# Patient Record
Sex: FEMALE | Race: WHITE | NOT HISPANIC OR LATINO | Employment: OTHER | URBAN - METROPOLITAN AREA
[De-identification: names, ages, dates, MRNs, and addresses within clinical notes are randomized per-mention and may not be internally consistent; named-entity substitution may affect disease eponyms.]

---

## 2023-06-30 ENCOUNTER — OFFICE VISIT (OUTPATIENT)
Dept: PRIMARY CARE | Facility: CLINIC | Age: 63
End: 2023-06-30
Payer: COMMERCIAL

## 2023-06-30 VITALS
OXYGEN SATURATION: 95 % | BODY MASS INDEX: 41.72 KG/M2 | DIASTOLIC BLOOD PRESSURE: 74 MMHG | WEIGHT: 265.8 LBS | SYSTOLIC BLOOD PRESSURE: 122 MMHG | HEIGHT: 67 IN | HEART RATE: 76 BPM

## 2023-06-30 DIAGNOSIS — K21.9 GASTROESOPHAGEAL REFLUX DISEASE WITHOUT ESOPHAGITIS: ICD-10-CM

## 2023-06-30 DIAGNOSIS — E55.9 VITAMIN D DEFICIENCY: ICD-10-CM

## 2023-06-30 DIAGNOSIS — E66.01 OBESITY, CLASS III, BMI 40-49.9 (MORBID OBESITY) (MULTI): ICD-10-CM

## 2023-06-30 DIAGNOSIS — I10 ESSENTIAL HYPERTENSION: ICD-10-CM

## 2023-06-30 DIAGNOSIS — M85.80 OSTEOPENIA, UNSPECIFIED LOCATION: ICD-10-CM

## 2023-06-30 DIAGNOSIS — I49.3 VENTRICULAR PREMATURE BEATS: ICD-10-CM

## 2023-06-30 DIAGNOSIS — E78.5 HYPERLIPIDEMIA, UNSPECIFIED HYPERLIPIDEMIA TYPE: ICD-10-CM

## 2023-06-30 DIAGNOSIS — C64.1 MALIGNANT NEOPLASM OF RIGHT KIDNEY (MULTI): ICD-10-CM

## 2023-06-30 DIAGNOSIS — I10 BENIGN HYPERTENSION: ICD-10-CM

## 2023-06-30 DIAGNOSIS — E55.9 MILD VITAMIN D DEFICIENCY: ICD-10-CM

## 2023-06-30 DIAGNOSIS — I49.3 PVC (PREMATURE VENTRICULAR CONTRACTION): ICD-10-CM

## 2023-06-30 DIAGNOSIS — E78.2 MIXED HYPERLIPIDEMIA: ICD-10-CM

## 2023-06-30 DIAGNOSIS — Z23 NEED FOR SHINGLES VACCINE: ICD-10-CM

## 2023-06-30 DIAGNOSIS — Z76.89 ENCOUNTER TO ESTABLISH CARE: ICD-10-CM

## 2023-06-30 DIAGNOSIS — Z12.31 VISIT FOR SCREENING MAMMOGRAM: Primary | ICD-10-CM

## 2023-06-30 PROBLEM — G47.33 OBSTRUCTIVE SLEEP APNEA: Status: ACTIVE | Noted: 2023-06-30

## 2023-06-30 PROBLEM — Z90.5 SOLITARY KIDNEY, ACQUIRED: Status: ACTIVE | Noted: 2023-06-30

## 2023-06-30 PROBLEM — N95.9 MENOPAUSAL AND POSTMENOPAUSAL DISORDER: Status: ACTIVE | Noted: 2023-06-30

## 2023-06-30 PROBLEM — R93.89 ABNORMAL CT SCAN: Status: ACTIVE | Noted: 2023-06-30

## 2023-06-30 PROBLEM — N28.89 RENAL MASS: Status: ACTIVE | Noted: 2021-04-27

## 2023-06-30 PROBLEM — E66.813 OBESITY, CLASS III, BMI 40-49.9 (MORBID OBESITY): Status: ACTIVE | Noted: 2021-05-04

## 2023-06-30 PROBLEM — E04.1 THYROID NODULE: Status: ACTIVE | Noted: 2023-06-30

## 2023-06-30 PROBLEM — R31.0 GROSS HEMATURIA: Status: ACTIVE | Noted: 2021-04-27

## 2023-06-30 PROCEDURE — 99204 OFFICE O/P NEW MOD 45 MIN: CPT | Performed by: CLINICAL NURSE SPECIALIST

## 2023-06-30 PROCEDURE — 3078F DIAST BP <80 MM HG: CPT | Performed by: CLINICAL NURSE SPECIALIST

## 2023-06-30 PROCEDURE — 90750 HZV VACC RECOMBINANT IM: CPT | Performed by: CLINICAL NURSE SPECIALIST

## 2023-06-30 PROCEDURE — 3074F SYST BP LT 130 MM HG: CPT | Performed by: CLINICAL NURSE SPECIALIST

## 2023-06-30 PROCEDURE — 1036F TOBACCO NON-USER: CPT | Performed by: CLINICAL NURSE SPECIALIST

## 2023-06-30 PROCEDURE — 90471 IMMUNIZATION ADMIN: CPT | Performed by: CLINICAL NURSE SPECIALIST

## 2023-06-30 RX ORDER — OMEGA-3-ACID ETHYL ESTERS 1 G/1
CAPSULE, LIQUID FILLED ORAL
Qty: 90 CAPSULE | Refills: 1 | Status: SHIPPED | OUTPATIENT
Start: 2023-06-30 | End: 2023-07-11 | Stop reason: SDUPTHER

## 2023-06-30 RX ORDER — PRAVASTATIN SODIUM 40 MG/1
40 TABLET ORAL DAILY
COMMUNITY
End: 2023-06-30 | Stop reason: SDUPTHER

## 2023-06-30 RX ORDER — OMEPRAZOLE 20 MG/1
20 CAPSULE, DELAYED RELEASE ORAL DAILY
COMMUNITY
End: 2023-06-30 | Stop reason: SDUPTHER

## 2023-06-30 RX ORDER — ATENOLOL 25 MG/1
25 TABLET ORAL DAILY
COMMUNITY
End: 2023-06-30 | Stop reason: SDUPTHER

## 2023-06-30 RX ORDER — OMEGA-3-ACID ETHYL ESTERS 1 G/1
2 CAPSULE, LIQUID FILLED ORAL 2 TIMES DAILY
Qty: 90 CAPSULE | Refills: 1 | Status: SHIPPED | OUTPATIENT
Start: 2023-06-30 | End: 2023-06-30

## 2023-06-30 RX ORDER — LISINOPRIL 20 MG/1
20 TABLET ORAL DAILY
COMMUNITY
End: 2023-06-30 | Stop reason: SDUPTHER

## 2023-06-30 RX ORDER — ALENDRONATE SODIUM 35 MG/1
35 TABLET ORAL
COMMUNITY
End: 2023-06-30 | Stop reason: SDUPTHER

## 2023-06-30 RX ORDER — ALENDRONATE SODIUM 35 MG/1
35 TABLET ORAL
Qty: 90 TABLET | Refills: 1 | Status: SHIPPED | OUTPATIENT
Start: 2023-06-30 | End: 2024-01-08 | Stop reason: SDUPTHER

## 2023-06-30 RX ORDER — ATENOLOL 25 MG/1
25 TABLET ORAL DAILY
Qty: 90 TABLET | Refills: 1 | Status: SHIPPED | OUTPATIENT
Start: 2023-06-30 | End: 2023-12-26

## 2023-06-30 RX ORDER — PRAVASTATIN SODIUM 40 MG/1
40 TABLET ORAL DAILY
Qty: 90 TABLET | Refills: 1 | Status: SHIPPED | OUTPATIENT
Start: 2023-06-30 | End: 2023-12-26

## 2023-06-30 RX ORDER — OMEGA-3-ACID ETHYL ESTERS 1 G/1
2 CAPSULE, LIQUID FILLED ORAL 2 TIMES DAILY
COMMUNITY
End: 2023-06-30 | Stop reason: SDUPTHER

## 2023-06-30 RX ORDER — ASPIRIN 325 MG
50000 TABLET, DELAYED RELEASE (ENTERIC COATED) ORAL
Qty: 13 CAPSULE | Refills: 1 | Status: SHIPPED | OUTPATIENT
Start: 2023-06-30 | End: 2024-01-08 | Stop reason: WASHOUT

## 2023-06-30 RX ORDER — OMEPRAZOLE 20 MG/1
20 CAPSULE, DELAYED RELEASE ORAL DAILY
Qty: 90 CAPSULE | Refills: 1 | Status: SHIPPED | OUTPATIENT
Start: 2023-06-30 | End: 2023-12-26

## 2023-06-30 RX ORDER — LISINOPRIL 20 MG/1
20 TABLET ORAL DAILY
Qty: 90 TABLET | Refills: 1 | Status: SHIPPED | OUTPATIENT
Start: 2023-06-30 | End: 2024-01-08 | Stop reason: SDUPTHER

## 2023-06-30 RX ORDER — ASPIRIN 325 MG
1 TABLET, DELAYED RELEASE (ENTERIC COATED) ORAL
COMMUNITY
Start: 2018-03-05 | End: 2023-06-30 | Stop reason: SDUPTHER

## 2023-06-30 ASSESSMENT — ENCOUNTER SYMPTOMS
FLANK PAIN: 0
ACTIVITY CHANGE: 0
VOMITING: 0
LOSS OF SENSATION IN FEET: 0
CONFUSION: 0
CONSTIPATION: 0
FEVER: 0
BRUISES/BLEEDS EASILY: 0
SLEEP DISTURBANCE: 0
SHORTNESS OF BREATH: 0
NAUSEA: 0
HEMATURIA: 0
SORE THROAT: 0
SEIZURES: 0
CHEST TIGHTNESS: 0
HEADACHES: 0
PALPITATIONS: 0
PHOTOPHOBIA: 0
OCCASIONAL FEELINGS OF UNSTEADINESS: 0
TROUBLE SWALLOWING: 0
DIARRHEA: 0
EYE PAIN: 0
JOINT SWELLING: 0
UNEXPECTED WEIGHT CHANGE: 0
APPETITE CHANGE: 0
ARTHRALGIAS: 0
DIZZINESS: 0
BACK PAIN: 0
CHILLS: 0
ABDOMINAL PAIN: 0
BLOOD IN STOOL: 0
COUGH: 0
MYALGIAS: 0
DEPRESSION: 0
NECK PAIN: 0
WHEEZING: 0
POLYDIPSIA: 0
DYSURIA: 0
FATIGUE: 0
WOUND: 0

## 2023-06-30 ASSESSMENT — ANXIETY QUESTIONNAIRES
3. WORRYING TOO MUCH ABOUT DIFFERENT THINGS: NOT AT ALL
2. NOT BEING ABLE TO STOP OR CONTROL WORRYING: NOT AT ALL
1. FEELING NERVOUS, ANXIOUS, OR ON EDGE: NOT AT ALL
6. BECOMING EASILY ANNOYED OR IRRITABLE: NOT AT ALL
GAD7 TOTAL SCORE: 0
7. FEELING AFRAID AS IF SOMETHING AWFUL MIGHT HAPPEN: NOT AT ALL
4. TROUBLE RELAXING: NOT AT ALL
5. BEING SO RESTLESS THAT IT IS HARD TO SIT STILL: NOT AT ALL
IF YOU CHECKED OFF ANY PROBLEMS ON THIS QUESTIONNAIRE, HOW DIFFICULT HAVE THESE PROBLEMS MADE IT FOR YOU TO DO YOUR WORK, TAKE CARE OF THINGS AT HOME, OR GET ALONG WITH OTHER PEOPLE: NOT DIFFICULT AT ALL

## 2023-06-30 ASSESSMENT — COLUMBIA-SUICIDE SEVERITY RATING SCALE - C-SSRS
1. IN THE PAST MONTH, HAVE YOU WISHED YOU WERE DEAD OR WISHED YOU COULD GO TO SLEEP AND NOT WAKE UP?: NO
2. HAVE YOU ACTUALLY HAD ANY THOUGHTS OF KILLING YOURSELF?: NO
6. HAVE YOU EVER DONE ANYTHING, STARTED TO DO ANYTHING, OR PREPARED TO DO ANYTHING TO END YOUR LIFE?: NO

## 2023-06-30 ASSESSMENT — PATIENT HEALTH QUESTIONNAIRE - PHQ9
SUM OF ALL RESPONSES TO PHQ9 QUESTIONS 1 AND 2: 0
1. LITTLE INTEREST OR PLEASURE IN DOING THINGS: NOT AT ALL
2. FEELING DOWN, DEPRESSED OR HOPELESS: NOT AT ALL

## 2023-06-30 NOTE — PROGRESS NOTES
Subjective   Patient ID: Ana Bustillos is a 62 y.o. female who presents for Establish Care (From [place]).  HPI    New patient here today to establish care.  Transfer care from [facility].  Last OV November 2023.     Follows with [facility] for Nephrology, every 6 months. Nephrectomy.     Overall feels well. Tolerating current medications.     Review of Systems   Constitutional:  Negative for activity change, appetite change, chills, fatigue, fever and unexpected weight change.   HENT:  Negative for ear pain, hearing loss, nosebleeds, sore throat, tinnitus and trouble swallowing.    Eyes:  Negative for photophobia, pain and visual disturbance.   Respiratory:  Negative for cough, chest tightness, shortness of breath and wheezing.    Cardiovascular:  Negative for chest pain, palpitations and leg swelling.   Gastrointestinal:  Negative for abdominal pain, blood in stool, constipation, diarrhea, nausea and vomiting.   Endocrine: Negative for cold intolerance, heat intolerance, polydipsia and polyuria.   Genitourinary:  Negative for dysuria, flank pain and hematuria.   Musculoskeletal:  Negative for arthralgias, back pain, joint swelling, myalgias and neck pain.   Skin:  Negative for pallor, rash and wound.   Allergic/Immunologic: Negative for immunocompromised state.   Neurological:  Negative for dizziness, seizures and headaches.   Hematological:  Does not bruise/bleed easily.   Psychiatric/Behavioral:  Negative for confusion and sleep disturbance.        Objective   Physical Exam  Vitals and nursing note reviewed.   Constitutional:       General: She is not in acute distress.     Appearance: Normal appearance.   HENT:      Head: Normocephalic.      Nose: Nose normal.   Eyes:      Conjunctiva/sclera: Conjunctivae normal.   Neck:      Vascular: No carotid bruit.   Cardiovascular:      Rate and Rhythm: Normal rate and regular rhythm.      Pulses: Normal pulses.      Heart sounds: Normal heart sounds.   Pulmonary:       Effort: Pulmonary effort is normal.      Breath sounds: Normal breath sounds.   Abdominal:      General: Bowel sounds are normal.      Palpations: Abdomen is soft.   Musculoskeletal:         General: Normal range of motion.      Cervical back: Normal range of motion.   Skin:     General: Skin is warm and dry.   Neurological:      Mental Status: She is alert and oriented to person, place, and time. Mental status is at baseline.   Psychiatric:         Mood and Affect: Mood normal.         Behavior: Behavior normal.         Assessment/Plan       Hypertension, PVC: Blood pressure controlled at OV today. Continue Lisinopril and Atenolol.   Hyperlipidemia: Continue Pravastatin. CT Cardiac Scoring ordered.   Vitamin D Deficiency: Continue Vitamin D. Reevaluate with next lab work.   GERD: Continue Prilosec.   Obesity: BMI: 42.26. Lifestyle changes recommended: Diet consisting of low fat foods, lean meats, high fiber, fresh fruits and vegetables. 150 min/ weekly aerobic exercise.   Malignant neoplasm of Kidney: Following with Nephrology.   Osteopenia: Continue Fosamax as prescribed.     Bone Density: December 2022.   Mammogram: December 2022.   Colonoscopy: March 2015. Plan to repeat in 10 years.    Tdap: July 2021.   COVID Vaccine: September 2022.   Shingrix: June 2023.

## 2023-07-10 ENCOUNTER — TELEPHONE (OUTPATIENT)
Dept: PRIMARY CARE | Facility: CLINIC | Age: 63
End: 2023-07-10
Payer: COMMERCIAL

## 2023-07-10 DIAGNOSIS — E78.5 HYPERLIPIDEMIA, UNSPECIFIED HYPERLIPIDEMIA TYPE: ICD-10-CM

## 2023-07-10 NOTE — TELEPHONE ENCOUNTER
VOICEMAIL:   Patient states she was supposed to get a 6 month supply and only got a quantity of 90 and she takes 2 a day please advise.

## 2023-07-11 RX ORDER — OMEGA-3-ACID ETHYL ESTERS 1 G/1
2 CAPSULE, LIQUID FILLED ORAL 2 TIMES DAILY
Qty: 360 CAPSULE | Refills: 1 | Status: SHIPPED | OUTPATIENT
Start: 2023-07-11 | End: 2023-10-17

## 2023-07-24 ENCOUNTER — LAB (OUTPATIENT)
Dept: LAB | Facility: LAB | Age: 63
End: 2023-07-24
Payer: COMMERCIAL

## 2023-07-24 DIAGNOSIS — E78.2 MIXED HYPERLIPIDEMIA: ICD-10-CM

## 2023-07-24 DIAGNOSIS — Z12.31 VISIT FOR SCREENING MAMMOGRAM: ICD-10-CM

## 2023-07-24 DIAGNOSIS — E55.9 VITAMIN D DEFICIENCY: ICD-10-CM

## 2023-07-24 DIAGNOSIS — I10 ESSENTIAL HYPERTENSION: ICD-10-CM

## 2023-07-24 LAB
CALCIDIOL (25 OH VITAMIN D3) (NG/ML) IN SER/PLAS: 83 NG/ML
CHOLESTEROL (MG/DL) IN SER/PLAS: 142 MG/DL (ref 0–199)
CHOLESTEROL IN HDL (MG/DL) IN SER/PLAS: 34.9 MG/DL
CHOLESTEROL/HDL RATIO: 4.1
COBALAMIN (VITAMIN B12) (PG/ML) IN SER/PLAS: 226 PG/ML (ref 211–911)
ERYTHROCYTE DISTRIBUTION WIDTH (RATIO) BY AUTOMATED COUNT: 14.2 % (ref 11.5–14.5)
ERYTHROCYTE MEAN CORPUSCULAR HEMOGLOBIN CONCENTRATION (G/DL) BY AUTOMATED: 33.3 G/DL (ref 32–36)
ERYTHROCYTE MEAN CORPUSCULAR VOLUME (FL) BY AUTOMATED COUNT: 92 FL (ref 80–100)
ERYTHROCYTES (10*6/UL) IN BLOOD BY AUTOMATED COUNT: 4.42 X10E12/L (ref 4–5.2)
HEMATOCRIT (%) IN BLOOD BY AUTOMATED COUNT: 40.5 % (ref 36–46)
HEMOGLOBIN (G/DL) IN BLOOD: 13.5 G/DL (ref 12–16)
LDL: 89 MG/DL (ref 0–99)
LEUKOCYTES (10*3/UL) IN BLOOD BY AUTOMATED COUNT: 7.8 X10E9/L (ref 4.4–11.3)
PLATELETS (10*3/UL) IN BLOOD AUTOMATED COUNT: 244 X10E9/L (ref 150–450)
THYROTROPIN (MIU/L) IN SER/PLAS BY DETECTION LIMIT <= 0.05 MIU/L: 1.56 MIU/L (ref 0.44–3.98)
TRIGLYCERIDE (MG/DL) IN SER/PLAS: 91 MG/DL (ref 0–149)
VLDL: 18 MG/DL (ref 0–40)

## 2023-07-24 PROCEDURE — 82607 VITAMIN B-12: CPT

## 2023-07-24 PROCEDURE — 82306 VITAMIN D 25 HYDROXY: CPT

## 2023-07-24 PROCEDURE — 85027 COMPLETE CBC AUTOMATED: CPT

## 2023-07-24 PROCEDURE — 80061 LIPID PANEL: CPT

## 2023-07-24 PROCEDURE — 36415 COLL VENOUS BLD VENIPUNCTURE: CPT

## 2023-07-24 PROCEDURE — 84443 ASSAY THYROID STIM HORMONE: CPT

## 2023-07-31 ENCOUNTER — TELEPHONE (OUTPATIENT)
Dept: PRIMARY CARE | Facility: CLINIC | Age: 63
End: 2023-07-31
Payer: COMMERCIAL

## 2023-07-31 NOTE — TELEPHONE ENCOUNTER
----- Message from LUKE Mcdonald-CNS sent at 7/30/2023 10:39 PM EDT -----  B12 is on the low end. Recommend to start/increase Vitamin B12. Remaining blood work stable. Repeat lab work prior to follow up appointment. Thank you!

## 2023-10-06 ENCOUNTER — HOSPITAL ENCOUNTER (OUTPATIENT)
Dept: RADIOLOGY | Facility: HOSPITAL | Age: 63
Discharge: HOME | End: 2023-10-06
Payer: COMMERCIAL

## 2023-10-06 DIAGNOSIS — E78.2 MIXED HYPERLIPIDEMIA: ICD-10-CM

## 2023-10-06 DIAGNOSIS — I10 ESSENTIAL (PRIMARY) HYPERTENSION: ICD-10-CM

## 2023-10-06 PROCEDURE — 75571 CT HRT W/O DYE W/CA TEST: CPT

## 2023-10-09 ENCOUNTER — TELEPHONE (OUTPATIENT)
Dept: PRIMARY CARE | Facility: CLINIC | Age: 63
End: 2023-10-09
Payer: COMMERCIAL

## 2023-10-09 NOTE — TELEPHONE ENCOUNTER
Maddi Ramírez, APRN-CNP  Myriam Gonzalez, JENNY; Radha Nelson MA  Please let patient know that CT Cardiac Scoring indicates moderate risk. Most recent LDL at goal. Continue Pravastatin. Start ASA 81mg daily. Keep follow up appointment with blood work as ordered. Thank you!

## 2023-10-10 ENCOUNTER — TELEPHONE (OUTPATIENT)
Dept: PRIMARY CARE | Facility: CLINIC | Age: 63
End: 2023-10-10
Payer: COMMERCIAL

## 2023-10-10 NOTE — TELEPHONE ENCOUNTER
Patient left message on voice mail to  call her back,   I called and left a message to call the office

## 2023-10-17 DIAGNOSIS — E78.5 HYPERLIPIDEMIA, UNSPECIFIED HYPERLIPIDEMIA TYPE: ICD-10-CM

## 2023-10-17 RX ORDER — OMEGA-3-ACID ETHYL ESTERS 1 G/1
2 CAPSULE, LIQUID FILLED ORAL 2 TIMES DAILY
Qty: 90 CAPSULE | Refills: 1 | Status: SHIPPED | OUTPATIENT
Start: 2023-10-17 | End: 2024-01-08 | Stop reason: SDUPTHER

## 2023-12-06 ENCOUNTER — HOSPITAL ENCOUNTER (OUTPATIENT)
Dept: RADIOLOGY | Facility: HOSPITAL | Age: 63
Discharge: HOME | End: 2023-12-06
Payer: MEDICARE

## 2023-12-06 DIAGNOSIS — Z12.31 VISIT FOR SCREENING MAMMOGRAM: ICD-10-CM

## 2023-12-06 PROCEDURE — 77067 SCR MAMMO BI INCL CAD: CPT

## 2023-12-06 PROCEDURE — 77063 BREAST TOMOSYNTHESIS BI: CPT

## 2023-12-06 PROCEDURE — 77063 BREAST TOMOSYNTHESIS BI: CPT | Mod: BILATERAL PROCEDURE | Performed by: RADIOLOGY

## 2023-12-06 PROCEDURE — 77067 SCR MAMMO BI INCL CAD: CPT | Mod: BILATERAL PROCEDURE | Performed by: RADIOLOGY

## 2023-12-07 ENCOUNTER — TELEPHONE (OUTPATIENT)
Dept: PRIMARY CARE | Facility: CLINIC | Age: 63
End: 2023-12-07
Payer: MEDICARE

## 2023-12-07 DIAGNOSIS — R92.8 ABNORMAL MAMMOGRAM OF RIGHT BREAST: ICD-10-CM

## 2023-12-07 NOTE — TELEPHONE ENCOUNTER
----- Message from LUKE Mcdonald-CNS sent at 12/6/2023  9:17 PM EST -----  Please order diagnostic right breast Mammogram and notify patient that additional views are needed. Thank you!

## 2023-12-12 ENCOUNTER — HOSPITAL ENCOUNTER (OUTPATIENT)
Dept: RADIOLOGY | Facility: HOSPITAL | Age: 63
Discharge: HOME | End: 2023-12-12
Payer: COMMERCIAL

## 2023-12-12 DIAGNOSIS — R92.8 ABNORMAL MAMMOGRAM OF RIGHT BREAST: ICD-10-CM

## 2023-12-12 PROCEDURE — 76642 ULTRASOUND BREAST LIMITED: CPT | Mod: RT

## 2023-12-12 PROCEDURE — 77065 DX MAMMO INCL CAD UNI: CPT | Mod: RIGHT SIDE | Performed by: RADIOLOGY

## 2023-12-12 PROCEDURE — G0279 TOMOSYNTHESIS, MAMMO: HCPCS | Mod: RIGHT SIDE | Performed by: RADIOLOGY

## 2023-12-12 PROCEDURE — 76642 ULTRASOUND BREAST LIMITED: CPT | Mod: RIGHT SIDE | Performed by: RADIOLOGY

## 2023-12-12 PROCEDURE — 77065 DX MAMMO INCL CAD UNI: CPT | Mod: RT

## 2023-12-13 ENCOUNTER — TELEPHONE (OUTPATIENT)
Dept: PRIMARY CARE | Facility: CLINIC | Age: 63
End: 2023-12-13
Payer: MEDICARE

## 2023-12-13 DIAGNOSIS — R92.8 ABNORMAL MAMMOGRAM OF RIGHT BREAST: ICD-10-CM

## 2023-12-13 NOTE — TELEPHONE ENCOUNTER
----- Message from LUKE Mcdonald-CNS sent at 12/12/2023  7:13 PM EST -----  Please order 6 month follow up Mammogram. Thank you!

## 2023-12-24 DIAGNOSIS — I49.3 VENTRICULAR PREMATURE BEATS: ICD-10-CM

## 2023-12-24 DIAGNOSIS — E78.5 HYPERLIPIDEMIA, UNSPECIFIED HYPERLIPIDEMIA TYPE: ICD-10-CM

## 2023-12-24 DIAGNOSIS — K21.9 GASTROESOPHAGEAL REFLUX DISEASE WITHOUT ESOPHAGITIS: ICD-10-CM

## 2023-12-26 RX ORDER — OMEPRAZOLE 20 MG/1
20 CAPSULE, DELAYED RELEASE ORAL DAILY
Qty: 90 CAPSULE | Refills: 1 | Status: SHIPPED | OUTPATIENT
Start: 2023-12-26 | End: 2024-01-08 | Stop reason: SDUPTHER

## 2023-12-26 RX ORDER — PRAVASTATIN SODIUM 40 MG/1
40 TABLET ORAL DAILY
Qty: 90 TABLET | Refills: 1 | Status: SHIPPED | OUTPATIENT
Start: 2023-12-26 | End: 2024-01-08 | Stop reason: SDUPTHER

## 2023-12-26 RX ORDER — ATENOLOL 25 MG/1
25 TABLET ORAL DAILY
Qty: 90 TABLET | Refills: 1 | Status: SHIPPED | OUTPATIENT
Start: 2023-12-26 | End: 2024-01-08 | Stop reason: SDUPTHER

## 2023-12-31 ENCOUNTER — APPOINTMENT (OUTPATIENT)
Dept: CARDIOLOGY | Facility: HOSPITAL | Age: 63
End: 2023-12-31
Payer: MEDICARE

## 2023-12-31 ENCOUNTER — APPOINTMENT (OUTPATIENT)
Dept: RADIOLOGY | Facility: HOSPITAL | Age: 63
End: 2023-12-31
Payer: MEDICARE

## 2023-12-31 ENCOUNTER — HOSPITAL ENCOUNTER (EMERGENCY)
Facility: HOSPITAL | Age: 63
Discharge: HOME | End: 2023-12-31
Attending: EMERGENCY MEDICINE
Payer: MEDICARE

## 2023-12-31 VITALS
WEIGHT: 270 LBS | RESPIRATION RATE: 22 BRPM | OXYGEN SATURATION: 95 % | DIASTOLIC BLOOD PRESSURE: 98 MMHG | HEIGHT: 66 IN | BODY MASS INDEX: 43.39 KG/M2 | TEMPERATURE: 98.4 F | SYSTOLIC BLOOD PRESSURE: 166 MMHG | HEART RATE: 86 BPM

## 2023-12-31 DIAGNOSIS — R20.2 COMPLAINT OF PARESTHESIA: Primary | ICD-10-CM

## 2023-12-31 LAB
ALBUMIN SERPL BCP-MCNC: 3.9 G/DL (ref 3.4–5)
ALP SERPL-CCNC: 49 U/L (ref 33–136)
ALT SERPL W P-5'-P-CCNC: 33 U/L (ref 7–45)
ANION GAP SERPL CALC-SCNC: 12 MMOL/L (ref 10–20)
AST SERPL W P-5'-P-CCNC: 21 U/L (ref 9–39)
BASOPHILS # BLD AUTO: 0.07 X10*3/UL (ref 0–0.1)
BASOPHILS NFR BLD AUTO: 0.9 %
BILIRUB SERPL-MCNC: 0.8 MG/DL (ref 0–1.2)
BUN SERPL-MCNC: 20 MG/DL (ref 6–23)
CALCIUM SERPL-MCNC: 9.7 MG/DL (ref 8.6–10.3)
CARDIAC TROPONIN I PNL SERPL HS: 4 NG/L (ref 0–13)
CHLORIDE SERPL-SCNC: 102 MMOL/L (ref 98–107)
CO2 SERPL-SCNC: 24 MMOL/L (ref 21–32)
CREAT SERPL-MCNC: 1.12 MG/DL (ref 0.5–1.05)
EOSINOPHIL # BLD AUTO: 0.14 X10*3/UL (ref 0–0.7)
EOSINOPHIL NFR BLD AUTO: 1.7 %
ERYTHROCYTE [DISTWIDTH] IN BLOOD BY AUTOMATED COUNT: 14 % (ref 11.5–14.5)
GFR SERPL CREATININE-BSD FRML MDRD: 55 ML/MIN/1.73M*2
GLUCOSE SERPL-MCNC: 123 MG/DL (ref 74–99)
HCT VFR BLD AUTO: 42.3 % (ref 36–46)
HGB BLD-MCNC: 14.5 G/DL (ref 12–16)
IMM GRANULOCYTES # BLD AUTO: 0.13 X10*3/UL (ref 0–0.7)
IMM GRANULOCYTES NFR BLD AUTO: 1.6 % (ref 0–0.9)
LYMPHOCYTES # BLD AUTO: 2.03 X10*3/UL (ref 1.2–4.8)
LYMPHOCYTES NFR BLD AUTO: 24.8 %
MAGNESIUM SERPL-MCNC: 1.7 MG/DL (ref 1.6–2.4)
MCH RBC QN AUTO: 30.7 PG (ref 26–34)
MCHC RBC AUTO-ENTMCNC: 34.3 G/DL (ref 32–36)
MCV RBC AUTO: 90 FL (ref 80–100)
MONOCYTES # BLD AUTO: 0.65 X10*3/UL (ref 0.1–1)
MONOCYTES NFR BLD AUTO: 7.9 %
NEUTROPHILS # BLD AUTO: 5.16 X10*3/UL (ref 1.2–7.7)
NEUTROPHILS NFR BLD AUTO: 63.1 %
NRBC BLD-RTO: 0 /100 WBCS (ref 0–0)
PHOSPHATE SERPL-MCNC: 3.3 MG/DL (ref 2.5–4.9)
PLATELET # BLD AUTO: 233 X10*3/UL (ref 150–450)
POTASSIUM SERPL-SCNC: 4.1 MMOL/L (ref 3.5–5.3)
PROT SERPL-MCNC: 7 G/DL (ref 6.4–8.2)
RBC # BLD AUTO: 4.72 X10*6/UL (ref 4–5.2)
SODIUM SERPL-SCNC: 134 MMOL/L (ref 136–145)
WBC # BLD AUTO: 8.2 X10*3/UL (ref 4.4–11.3)

## 2023-12-31 PROCEDURE — 71045 X-RAY EXAM CHEST 1 VIEW: CPT | Performed by: RADIOLOGY

## 2023-12-31 PROCEDURE — 36415 COLL VENOUS BLD VENIPUNCTURE: CPT | Performed by: EMERGENCY MEDICINE

## 2023-12-31 PROCEDURE — 84100 ASSAY OF PHOSPHORUS: CPT | Performed by: EMERGENCY MEDICINE

## 2023-12-31 PROCEDURE — 99283 EMERGENCY DEPT VISIT LOW MDM: CPT | Mod: 25

## 2023-12-31 PROCEDURE — 80053 COMPREHEN METABOLIC PANEL: CPT | Performed by: EMERGENCY MEDICINE

## 2023-12-31 PROCEDURE — 93005 ELECTROCARDIOGRAM TRACING: CPT

## 2023-12-31 PROCEDURE — 71045 X-RAY EXAM CHEST 1 VIEW: CPT

## 2023-12-31 PROCEDURE — 84484 ASSAY OF TROPONIN QUANT: CPT | Performed by: EMERGENCY MEDICINE

## 2023-12-31 PROCEDURE — 83735 ASSAY OF MAGNESIUM: CPT | Performed by: EMERGENCY MEDICINE

## 2023-12-31 PROCEDURE — 85025 COMPLETE CBC W/AUTO DIFF WBC: CPT | Performed by: EMERGENCY MEDICINE

## 2023-12-31 PROCEDURE — 99284 EMERGENCY DEPT VISIT MOD MDM: CPT | Performed by: EMERGENCY MEDICINE

## 2023-12-31 ASSESSMENT — LIFESTYLE VARIABLES
REASON UNABLE TO ASSESS: NO
EVER FELT BAD OR GUILTY ABOUT YOUR DRINKING: NO
HAVE YOU EVER FELT YOU SHOULD CUT DOWN ON YOUR DRINKING: NO
HAVE PEOPLE ANNOYED YOU BY CRITICIZING YOUR DRINKING: NO
EVER HAD A DRINK FIRST THING IN THE MORNING TO STEADY YOUR NERVES TO GET RID OF A HANGOVER: NO

## 2023-12-31 ASSESSMENT — PAIN SCALES - GENERAL: PAINLEVEL_OUTOF10: 0 - NO PAIN

## 2023-12-31 ASSESSMENT — PAIN - FUNCTIONAL ASSESSMENT: PAIN_FUNCTIONAL_ASSESSMENT: 0-10

## 2023-12-31 NOTE — ED PROVIDER NOTES
HPI   Chief Complaint   Patient presents with    Tingling     In hands rosy for past 3 days.  No pain.  No sob.       Patient presents to the emergency department for intermittent bilateral hand tingling.  Patient states she is under a lot of stress lately.  She has recovered from kidney cancer after nephrectomy.  She has her next 6-month scan coming up next week and has been under a lot of stress.  She is also concerned about her heart.  Patient has any chest pain, shortness of breath, diaphoresis, exertional symptoms, nausea vomiting.  The tingling in the hands is not currently going on for last few days.  It is bilateral and intermittent.  Patient has any fever or sick contacts.                          [place] Coma Scale Score: 15                  Patient History   Past Medical History:   Diagnosis Date    Personal history of COVID-19 01/25/2022    History of COVID-19    Personal history of other diseases of urinary system     History of hematuria    Personal history of other malignant neoplasm of kidney     History of renal carcinoma    Personal history of other malignant neoplasm of skin     History of other malignant neoplasm of skin    Urinary tract infection, site not specified 03/16/2021    Acute lower UTI     Past Surgical History:   Procedure Laterality Date    OTHER SURGICAL HISTORY  01/16/2020    Excision of basal cell carcinoma    OTHER SURGICAL HISTORY  07/27/2021    Kidney surgery    OTHER SURGICAL HISTORY  07/27/2021    Nephrectomy    OTHER SURGICAL HISTORY  03/16/2021    Cataract surgery     No family history on file.  Social History     Tobacco Use    Smoking status: Never    Smokeless tobacco: Never   Vaping Use    Vaping Use: Never used   Substance Use Topics    Alcohol use: Yes     Alcohol/week: 2.0 standard drinks of alcohol     Types: 1 Glasses of wine, 1 Shots of liquor per week    Drug use: Never       Physical Exam   ED Triage Vitals [12/31/23 1028]   Temp Heart Rate Resp BP   (!) -17.3 °C  (0.8 °F) 90 16 (!) 181/108      SpO2 Temp Source Heart Rate Source Patient Position   96 % Tympanic Monitor Sitting      BP Location FiO2 (%)     Left arm --       Physical Exam  Vitals and nursing note reviewed.   Constitutional:       General: She is not in acute distress.     Appearance: She is well-developed.   HENT:      Head: Normocephalic and atraumatic.      Right Ear: External ear normal.      Left Ear: External ear normal.      Nose: Nose normal.      Mouth/Throat:      Mouth: Mucous membranes are moist.      Pharynx: Oropharynx is clear.   Eyes:      Extraocular Movements: Extraocular movements intact.      Conjunctiva/sclera: Conjunctivae normal.   Neck:      Comments: Trachea midline  Cardiovascular:      Rate and Rhythm: Normal rate.      Pulses: Normal pulses.   Pulmonary:      Effort: Pulmonary effort is normal. No respiratory distress.      Breath sounds: Normal breath sounds.   Abdominal:      General: Bowel sounds are normal.      Palpations: Abdomen is soft.      Tenderness: There is no abdominal tenderness.   Musculoskeletal:         General: No swelling or tenderness.      Cervical back: No tenderness.   Skin:     General: Skin is warm and dry.      Capillary Refill: Capillary refill takes less than 2 seconds.   Neurological:      General: No focal deficit present.      Mental Status: She is alert and oriented to person, place, and time.   Psychiatric:         Mood and Affect: Mood normal.         Thought Content: Thought content does not include homicidal or suicidal ideation.         ED Course & MDM   Diagnoses as of 12/31/23 1227   Complaint of paresthesia   Creatinine elevation       Medical Decision Making  Patient presents with bilateral tingling of the hands and cardiac concern. Available chart reviewed. On initial assessment the patient was found non-toxic, no acute distress, vitals hemodynamically stable and afebrile. Initial concern for ACS, electrolyte abnormalities.  Given patient's  concern we will do a cardiac workup.  Clinically I feel this is all anxiety related.  Had discussion with the patient about strokes as well as metastatic spread of cancers.  She is not concerned about this.  Will not obtain a CT of the head at this time. CBC shows no leukocytosis, no anemia, no thrombocytopenia.  Magnesium and phosphorus are normal.  Metabolic panel shows slight hyponatremia of 134, elevation of creatinine at 1.12 and normal liver function.  Patient admits she has not drink as much water as she should be.  Troponins normal.  Chest x-ray is read as negative.  Patient feels reassured.    No indication for admission.  Discussed findings and diagnosis with the patient, follow-up and return to ED precautions given, patient voiced understanding, agrees with plan, questions answered, patient was discharged in stable condition.        Amount and/or Complexity of Data Reviewed  ECG/medicine tests: independent interpretation performed.     Details: EKG obtained at 1143 shows sinus rhythm, rate 77, , QTc 525, T wave inversion in lead III, no ST segment changes        Procedure  Procedures     Cachorro Pimentel MD  12/31/23 1222       Cachorro Pimentel MD  12/31/23 1228

## 2024-01-05 ENCOUNTER — APPOINTMENT (OUTPATIENT)
Dept: PRIMARY CARE | Facility: CLINIC | Age: 64
End: 2024-01-05
Payer: MEDICARE

## 2024-01-07 DIAGNOSIS — I10 BENIGN HYPERTENSION: ICD-10-CM

## 2024-01-08 ENCOUNTER — OFFICE VISIT (OUTPATIENT)
Dept: PRIMARY CARE | Facility: CLINIC | Age: 64
End: 2024-01-08
Payer: MEDICARE

## 2024-01-08 VITALS
HEIGHT: 67 IN | SYSTOLIC BLOOD PRESSURE: 120 MMHG | HEART RATE: 68 BPM | BODY MASS INDEX: 43.16 KG/M2 | WEIGHT: 275 LBS | DIASTOLIC BLOOD PRESSURE: 80 MMHG

## 2024-01-08 DIAGNOSIS — I10 ESSENTIAL HYPERTENSION: ICD-10-CM

## 2024-01-08 DIAGNOSIS — E55.9 VITAMIN D DEFICIENCY: ICD-10-CM

## 2024-01-08 DIAGNOSIS — I49.3 VENTRICULAR PREMATURE BEATS: ICD-10-CM

## 2024-01-08 DIAGNOSIS — E78.2 MIXED HYPERLIPIDEMIA: ICD-10-CM

## 2024-01-08 DIAGNOSIS — E78.5 HYPERLIPIDEMIA, UNSPECIFIED HYPERLIPIDEMIA TYPE: ICD-10-CM

## 2024-01-08 DIAGNOSIS — Z12.31 VISIT FOR SCREENING MAMMOGRAM: ICD-10-CM

## 2024-01-08 DIAGNOSIS — I10 BENIGN HYPERTENSION: ICD-10-CM

## 2024-01-08 DIAGNOSIS — K21.9 GASTROESOPHAGEAL REFLUX DISEASE WITHOUT ESOPHAGITIS: ICD-10-CM

## 2024-01-08 DIAGNOSIS — M85.80 OSTEOPENIA, UNSPECIFIED LOCATION: ICD-10-CM

## 2024-01-08 DIAGNOSIS — Z00.00 WELCOME TO MEDICARE PREVENTIVE VISIT: ICD-10-CM

## 2024-01-08 PROCEDURE — 3079F DIAST BP 80-89 MM HG: CPT | Performed by: CLINICAL NURSE SPECIALIST

## 2024-01-08 PROCEDURE — 99214 OFFICE O/P EST MOD 30 MIN: CPT | Performed by: CLINICAL NURSE SPECIALIST

## 2024-01-08 PROCEDURE — 3074F SYST BP LT 130 MM HG: CPT | Performed by: CLINICAL NURSE SPECIALIST

## 2024-01-08 PROCEDURE — 90677 PCV20 VACCINE IM: CPT | Performed by: CLINICAL NURSE SPECIALIST

## 2024-01-08 PROCEDURE — 1159F MED LIST DOCD IN RCRD: CPT | Performed by: CLINICAL NURSE SPECIALIST

## 2024-01-08 PROCEDURE — 99497 ADVNCD CARE PLAN 30 MIN: CPT | Performed by: CLINICAL NURSE SPECIALIST

## 2024-01-08 PROCEDURE — 1036F TOBACCO NON-USER: CPT | Performed by: CLINICAL NURSE SPECIALIST

## 2024-01-08 PROCEDURE — 1160F RVW MEDS BY RX/DR IN RCRD: CPT | Performed by: CLINICAL NURSE SPECIALIST

## 2024-01-08 PROCEDURE — G0009 ADMIN PNEUMOCOCCAL VACCINE: HCPCS | Performed by: CLINICAL NURSE SPECIALIST

## 2024-01-08 PROCEDURE — G0402 INITIAL PREVENTIVE EXAM: HCPCS | Performed by: CLINICAL NURSE SPECIALIST

## 2024-01-08 PROCEDURE — 1170F FXNL STATUS ASSESSED: CPT | Performed by: CLINICAL NURSE SPECIALIST

## 2024-01-08 PROCEDURE — 1126F AMNT PAIN NOTED NONE PRSNT: CPT | Performed by: CLINICAL NURSE SPECIALIST

## 2024-01-08 RX ORDER — ATENOLOL 25 MG/1
25 TABLET ORAL DAILY
Qty: 90 TABLET | Refills: 1 | Status: SHIPPED | OUTPATIENT
Start: 2024-01-08

## 2024-01-08 RX ORDER — OMEPRAZOLE 20 MG/1
20 CAPSULE, DELAYED RELEASE ORAL DAILY
Qty: 90 CAPSULE | Refills: 1 | Status: SHIPPED | OUTPATIENT
Start: 2024-01-08

## 2024-01-08 RX ORDER — ACETAMINOPHEN 500 MG
5000 TABLET ORAL DAILY
COMMUNITY

## 2024-01-08 RX ORDER — CYANOCOBALAMIN (VITAMIN B-12) 250 MCG
250 TABLET ORAL DAILY
COMMUNITY

## 2024-01-08 RX ORDER — PRAVASTATIN SODIUM 40 MG/1
40 TABLET ORAL DAILY
Qty: 90 TABLET | Refills: 1 | Status: SHIPPED | OUTPATIENT
Start: 2024-01-08

## 2024-01-08 RX ORDER — ALENDRONATE SODIUM 35 MG/1
35 TABLET ORAL
Qty: 90 TABLET | Refills: 1 | Status: SHIPPED | OUTPATIENT
Start: 2024-01-08

## 2024-01-08 RX ORDER — OMEGA-3-ACID ETHYL ESTERS 1 G/1
2 CAPSULE, LIQUID FILLED ORAL 2 TIMES DAILY
Qty: 90 CAPSULE | Refills: 1 | Status: SHIPPED | OUTPATIENT
Start: 2024-01-08 | End: 2024-01-29

## 2024-01-08 RX ORDER — LISINOPRIL 20 MG/1
20 TABLET ORAL DAILY
Qty: 90 TABLET | Refills: 1 | Status: SHIPPED | OUTPATIENT
Start: 2024-01-08 | End: 2024-04-16 | Stop reason: ALTCHOICE

## 2024-01-08 RX ORDER — LISINOPRIL 20 MG/1
20 TABLET ORAL DAILY
Qty: 90 TABLET | Refills: 1 | Status: SHIPPED | OUTPATIENT
Start: 2024-01-08

## 2024-01-08 ASSESSMENT — ENCOUNTER SYMPTOMS
NECK PAIN: 0
ACTIVITY CHANGE: 0
DYSURIA: 0
EYE PAIN: 0
OCCASIONAL FEELINGS OF UNSTEADINESS: 0
DEPRESSION: 0
DIZZINESS: 0
LOSS OF SENSATION IN FEET: 0
ABDOMINAL PAIN: 0
UNEXPECTED WEIGHT CHANGE: 0
PHOTOPHOBIA: 0
SHORTNESS OF BREATH: 0
BLOOD IN STOOL: 0
APPETITE CHANGE: 0
CONSTIPATION: 0
CONFUSION: 0
BRUISES/BLEEDS EASILY: 0
JOINT SWELLING: 0
MYALGIAS: 0
BACK PAIN: 0
POLYDIPSIA: 0
HEADACHES: 0
NAUSEA: 0
VOMITING: 0
ARTHRALGIAS: 0
DIARRHEA: 0
WOUND: 0
SEIZURES: 0
SLEEP DISTURBANCE: 0
HEMATURIA: 0
PALPITATIONS: 0
FLANK PAIN: 0
FATIGUE: 0
TROUBLE SWALLOWING: 0
SORE THROAT: 0
CHEST TIGHTNESS: 0
COUGH: 0
FEVER: 0
WHEEZING: 0
CHILLS: 0

## 2024-01-08 ASSESSMENT — ACTIVITIES OF DAILY LIVING (ADL)
GROCERY_SHOPPING: INDEPENDENT
MANAGING_FINANCES: INDEPENDENT
DOING_HOUSEWORK: INDEPENDENT
DRESSING: INDEPENDENT
TAKING_MEDICATION: INDEPENDENT
BATHING: INDEPENDENT

## 2024-01-08 ASSESSMENT — PATIENT HEALTH QUESTIONNAIRE - PHQ9
2. FEELING DOWN, DEPRESSED OR HOPELESS: NOT AT ALL
SUM OF ALL RESPONSES TO PHQ9 QUESTIONS 1 AND 2: 0
1. LITTLE INTEREST OR PLEASURE IN DOING THINGS: NOT AT ALL

## 2024-01-08 ASSESSMENT — VISUAL ACUITY
OD_CC: 20/13
OS_CC: 20/25

## 2024-01-08 NOTE — PROGRESS NOTES
Subjective   Reason for Visit: Ana Bustillos is an 63 y.o. female here for a Medicare Wellness visit.     Past Medical, Surgical, and Family History reviewed and updated in chart.    Reviewed all medications by prescribing practitioner or clinical pharmacist (such as prescriptions, OTCs, herbal therapies and supplements) and documented in the medical record.    HPI    Here today as a follow up appointment. Due for Welcome to Medicare.      Follows with [facility] for Nephrology, every 6 months. Nephrectomy.      Overall feels well. Tolerating current medications.     Seen in the ER on 12/31 with Numbness/Tingling. Concerned for Cardiac. Work up negative, felt attributed to by Anxiety. Feels that it was related to all of her Health follow ups, improved at this time. Feels that she has gotten through it.     Patient Care Team:  ALDO Mcdonald as PCP - General (Family Medicine)  JOANNE Rebolledo as Nurse Practitioner (Family Medicine)     Review of Systems   Constitutional:  Negative for activity change, appetite change, chills, fatigue, fever and unexpected weight change.   HENT:  Negative for ear pain, hearing loss, nosebleeds, sore throat, tinnitus and trouble swallowing.    Eyes:  Negative for photophobia, pain and visual disturbance.   Respiratory:  Negative for cough, chest tightness, shortness of breath and wheezing.    Cardiovascular:  Negative for chest pain, palpitations and leg swelling.   Gastrointestinal:  Negative for abdominal pain, blood in stool, constipation, diarrhea, nausea and vomiting.   Endocrine: Negative for cold intolerance, heat intolerance, polydipsia and polyuria.   Genitourinary:  Negative for dysuria, flank pain and hematuria.   Musculoskeletal:  Negative for arthralgias, back pain, joint swelling, myalgias and neck pain.   Skin:  Negative for pallor, rash and wound.   Allergic/Immunologic: Negative for immunocompromised state.   Neurological:  Negative for  "dizziness, seizures and headaches.   Hematological:  Does not bruise/bleed easily.   Psychiatric/Behavioral:  Negative for confusion and sleep disturbance.        Objective   Vitals:  /80 (BP Location: Right arm, Patient Position: Sitting)   Pulse 68   Ht 1.689 m (5' 6.5\")   Wt 125 kg (275 lb)   BMI 43.72 kg/m²       Physical Exam  Vitals and nursing note reviewed.   Constitutional:       General: She is not in acute distress.     Appearance: Normal appearance.   HENT:      Head: Normocephalic.      Nose: Nose normal.   Eyes:      Conjunctiva/sclera: Conjunctivae normal.   Neck:      Vascular: No carotid bruit.   Cardiovascular:      Rate and Rhythm: Normal rate and regular rhythm.      Pulses: Normal pulses.      Heart sounds: Normal heart sounds.   Pulmonary:      Effort: Pulmonary effort is normal.      Breath sounds: Normal breath sounds.   Abdominal:      General: Bowel sounds are normal.      Palpations: Abdomen is soft.   Musculoskeletal:         General: Normal range of motion.      Cervical back: Normal range of motion.   Skin:     General: Skin is warm and dry.   Neurological:      Mental Status: She is alert and oriented to person, place, and time. Mental status is at baseline.   Psychiatric:         Mood and Affect: Mood normal.         Behavior: Behavior normal.         Assessment/Plan   Problem List Items Addressed This Visit       Essential hypertension    Gastroesophageal reflux disease without esophagitis    Relevant Medications    omeprazole (PriLOSEC) 20 mg DR capsule    Hyperlipidemia    Relevant Medications    pravastatin (Pravachol) 40 mg tablet    omega-3 acid ethyl esters (Lovaza) 1 gram capsule    Osteopenia    Relevant Medications    alendronate (Fosamax) 35 mg tablet    Vitamin D deficiency     Other Visit Diagnoses       Visit for screening mammogram        Ventricular premature beats        Relevant Medications    atenolol (Tenormin) 25 mg tablet    Benign hypertension        " Relevant Medications    lisinopril 20 mg tablet        Reviewed most recent records available.      Hypertension, PVC: Blood pressure controlled at OV today. Continue Lisinopril and Atenolol.   Hyperlipidemia: Continue Pravastatin. CT Cardiac Scoring: October 2023, moderate risk.   Vitamin D Deficiency: Continue Vitamin D. Reevaluate with next lab work.   GERD: Continue Prilosec.   Obesity: BMI: 43.75. Lifestyle changes recommended: Diet consisting of low fat foods, lean meats, high fiber, fresh fruits and vegetables. 150 min/ weekly aerobic exercise.   Malignant neoplasm of Kidney: Following with Nephrology. Follow up scheduled for tomorrow.   Osteopenia: Continue Fosamax as prescribed.  Medicare Wellness: Routine and age appropriate recommendations discussed with the patient today and patient verbalized understanding of the recommendations.  Questions answered.  Age appropriate immunizations and preventative screenings discussed with the patient and ordered as appropriate. Labs updated and ordered as indicated. Recommend healthy diet and daily exercise to maintain healthy body weight. ECG done in the ER on 12/31/2023. Discussed ACP documents.   Renal Insufficiency: Planning to discuss with Nephrology tomorrow.       Bone Density: December 2022.   Mammogram: December 2023. Short term follow up.   Colonoscopy: March 2015. Plan to repeat in 10 years.    Tdap: July 2021.   Flu Vaccine: October 2023.   COVID Vaccine: October 2023.   Shingrix: June 2023. Discussed updated.   Discussed RSV Vaccine.   Prevnar 20: January 2024.

## 2024-01-10 LAB
ATRIAL RATE: 77 BPM
P AXIS: 23 DEGREES
PR INTERVAL: 191 MS
Q ONSET: 252 MS
QRS COUNT: 12 BEATS
QRS DURATION: 94 MS
QT INTERVAL: 375 MS
QTC CALCULATION(BAZETT): 425 MS
QTC FREDERICIA: 407 MS
R AXIS: -24 DEGREES
T AXIS: 12 DEGREES
T OFFSET: 440 MS
VENTRICULAR RATE: 77 BPM

## 2024-01-29 DIAGNOSIS — E78.5 HYPERLIPIDEMIA, UNSPECIFIED HYPERLIPIDEMIA TYPE: ICD-10-CM

## 2024-01-29 RX ORDER — OMEGA-3-ACID ETHYL ESTERS 1 G/1
2 CAPSULE, LIQUID FILLED ORAL 2 TIMES DAILY
Qty: 90 CAPSULE | Refills: 1 | Status: SHIPPED | OUTPATIENT
Start: 2024-01-29 | End: 2024-03-28

## 2024-03-28 DIAGNOSIS — E78.5 HYPERLIPIDEMIA, UNSPECIFIED HYPERLIPIDEMIA TYPE: ICD-10-CM

## 2024-03-28 RX ORDER — OMEGA-3-ACID ETHYL ESTERS 1 G/1
2 CAPSULE, LIQUID FILLED ORAL 2 TIMES DAILY
Qty: 90 CAPSULE | Refills: 3 | Status: SHIPPED | OUTPATIENT
Start: 2024-03-28 | End: 2024-04-22

## 2024-04-16 ENCOUNTER — OFFICE VISIT (OUTPATIENT)
Dept: PRIMARY CARE | Facility: CLINIC | Age: 64
End: 2024-04-16
Payer: MEDICARE

## 2024-04-16 VITALS
SYSTOLIC BLOOD PRESSURE: 120 MMHG | DIASTOLIC BLOOD PRESSURE: 70 MMHG | HEART RATE: 72 BPM | HEIGHT: 67 IN | BODY MASS INDEX: 39.87 KG/M2 | WEIGHT: 254 LBS

## 2024-04-16 DIAGNOSIS — M25.551 RIGHT HIP PAIN: Primary | ICD-10-CM

## 2024-04-16 PROCEDURE — 1123F ACP DISCUSS/DSCN MKR DOCD: CPT | Performed by: CLINICAL NURSE SPECIALIST

## 2024-04-16 PROCEDURE — 1160F RVW MEDS BY RX/DR IN RCRD: CPT | Performed by: CLINICAL NURSE SPECIALIST

## 2024-04-16 PROCEDURE — 3078F DIAST BP <80 MM HG: CPT | Performed by: CLINICAL NURSE SPECIALIST

## 2024-04-16 PROCEDURE — 1036F TOBACCO NON-USER: CPT | Performed by: CLINICAL NURSE SPECIALIST

## 2024-04-16 PROCEDURE — 1159F MED LIST DOCD IN RCRD: CPT | Performed by: CLINICAL NURSE SPECIALIST

## 2024-04-16 PROCEDURE — 3074F SYST BP LT 130 MM HG: CPT | Performed by: CLINICAL NURSE SPECIALIST

## 2024-04-16 PROCEDURE — 99213 OFFICE O/P EST LOW 20 MIN: CPT | Performed by: CLINICAL NURSE SPECIALIST

## 2024-04-16 ASSESSMENT — ENCOUNTER SYMPTOMS
FATIGUE: 0
CHEST TIGHTNESS: 0
MYALGIAS: 0
CONSTIPATION: 0
DIARRHEA: 0
EYE PAIN: 0
PHOTOPHOBIA: 0
ARTHRALGIAS: 1
HEMATURIA: 0
BLOOD IN STOOL: 0
DYSURIA: 0
BRUISES/BLEEDS EASILY: 0
SEIZURES: 0
NAUSEA: 0
APPETITE CHANGE: 0
DIZZINESS: 0
HEADACHES: 0
FLANK PAIN: 0
FEVER: 0
POLYDIPSIA: 0
BACK PAIN: 0
JOINT SWELLING: 0
CHILLS: 0
DEPRESSION: 0
LOSS OF SENSATION IN FEET: 0
VOMITING: 0
COUGH: 0
ABDOMINAL PAIN: 0
CONFUSION: 0
SORE THROAT: 0
SHORTNESS OF BREATH: 0
WOUND: 0
NECK PAIN: 0
PALPITATIONS: 0
OCCASIONAL FEELINGS OF UNSTEADINESS: 0
UNEXPECTED WEIGHT CHANGE: 0
WHEEZING: 0
SLEEP DISTURBANCE: 0
TROUBLE SWALLOWING: 0
ACTIVITY CHANGE: 0

## 2024-04-16 ASSESSMENT — COLUMBIA-SUICIDE SEVERITY RATING SCALE - C-SSRS
6. HAVE YOU EVER DONE ANYTHING, STARTED TO DO ANYTHING, OR PREPARED TO DO ANYTHING TO END YOUR LIFE?: NO
1. IN THE PAST MONTH, HAVE YOU WISHED YOU WERE DEAD OR WISHED YOU COULD GO TO SLEEP AND NOT WAKE UP?: NO
2. HAVE YOU ACTUALLY HAD ANY THOUGHTS OF KILLING YOURSELF?: NO

## 2024-04-16 ASSESSMENT — PATIENT HEALTH QUESTIONNAIRE - PHQ9
1. LITTLE INTEREST OR PLEASURE IN DOING THINGS: NOT AT ALL
SUM OF ALL RESPONSES TO PHQ9 QUESTIONS 1 AND 2: 0
2. FEELING DOWN, DEPRESSED OR HOPELESS: NOT AT ALL

## 2024-04-16 NOTE — PROGRESS NOTES
Subjective   Patient ID: Ana Bustillos is a 63 y.o. female who presents for Acute Visit (Discuss right hip pain ).  HPI    Patient presents in the office today as an acute visit. Concerned for right hip pain. Feels worse discomfort on the right hip. Worse with walking with incline. Walking on uneven surfaces. Walking up the stairs is increased pain. Has been using Tylenol PRN with increased discomfort. Limited NSAIDs due to Hypertension. Pain radiates down into the thigh. No loss of bowel or bladder function. Denies any skin changes. Water classes 3x a week. Chair Yoga 3x a week. Does not help/worsen the pain.     Review of Systems   Constitutional:  Negative for activity change, appetite change, chills, fatigue, fever and unexpected weight change.   HENT:  Negative for ear pain, hearing loss, nosebleeds, sore throat, tinnitus and trouble swallowing.    Eyes:  Negative for photophobia, pain and visual disturbance.   Respiratory:  Negative for cough, chest tightness, shortness of breath and wheezing.    Cardiovascular:  Negative for chest pain, palpitations and leg swelling.   Gastrointestinal:  Negative for abdominal pain, blood in stool, constipation, diarrhea, nausea and vomiting.   Endocrine: Negative for cold intolerance, heat intolerance, polydipsia and polyuria.   Genitourinary:  Negative for dysuria, flank pain and hematuria.   Musculoskeletal:  Positive for arthralgias. Negative for back pain, joint swelling, myalgias and neck pain.   Skin:  Negative for pallor, rash and wound.   Allergic/Immunologic: Negative for immunocompromised state.   Neurological:  Negative for dizziness, seizures and headaches.   Hematological:  Does not bruise/bleed easily.   Psychiatric/Behavioral:  Negative for confusion and sleep disturbance.        Objective   Physical Exam  Vitals and nursing note reviewed.   Constitutional:       General: She is not in acute distress.     Appearance: Normal appearance.   HENT:      Head:  Normocephalic.      Nose: Nose normal.   Eyes:      Conjunctiva/sclera: Conjunctivae normal.   Neck:      Vascular: No carotid bruit.   Cardiovascular:      Rate and Rhythm: Normal rate and regular rhythm.      Pulses: Normal pulses.      Heart sounds: Normal heart sounds.   Pulmonary:      Effort: Pulmonary effort is normal.      Breath sounds: Normal breath sounds.   Abdominal:      General: Bowel sounds are normal.      Palpations: Abdomen is soft.   Musculoskeletal:         General: Normal range of motion.      Cervical back: Normal range of motion.   Skin:     General: Skin is warm and dry.   Neurological:      Mental Status: She is alert and oriented to person, place, and time. Mental status is at baseline.   Psychiatric:         Mood and Affect: Mood normal.         Behavior: Behavior normal.         Assessment/Plan        Right Hip Pain: Therapy referral. Will follow up pending response of Therapy. Can continue physical activity. Discussed use of OTC pain relievers PRN.     Keep routine follow up appointment.     LUKE Mcdonald-CNS 04/16/24 3:31 PM

## 2024-04-21 DIAGNOSIS — E78.5 HYPERLIPIDEMIA, UNSPECIFIED HYPERLIPIDEMIA TYPE: ICD-10-CM

## 2024-04-22 RX ORDER — OMEGA-3-ACID ETHYL ESTERS 1 G/1
2 CAPSULE, LIQUID FILLED ORAL 2 TIMES DAILY
Qty: 90 CAPSULE | Refills: 1 | Status: SHIPPED | OUTPATIENT
Start: 2024-04-22

## 2024-04-26 ENCOUNTER — EVALUATION (OUTPATIENT)
Dept: PHYSICAL THERAPY | Facility: HOSPITAL | Age: 64
End: 2024-04-26
Payer: MEDICARE

## 2024-04-26 DIAGNOSIS — M25.551 PAIN OF RIGHT HIP: ICD-10-CM

## 2024-04-26 DIAGNOSIS — M25.551 RIGHT HIP PAIN: ICD-10-CM

## 2024-04-26 DIAGNOSIS — M16.11 PRIMARY OSTEOARTHRITIS OF RIGHT HIP: Primary | ICD-10-CM

## 2024-04-26 PROCEDURE — 97110 THERAPEUTIC EXERCISES: CPT | Mod: GP

## 2024-04-26 PROCEDURE — 97161 PT EVAL LOW COMPLEX 20 MIN: CPT | Mod: GP

## 2024-04-26 SDOH — ECONOMIC STABILITY: FOOD INSECURITY: WITHIN THE PAST 12 MONTHS, THE FOOD YOU BOUGHT JUST DIDN'T LAST AND YOU DIDN'T HAVE MONEY TO GET MORE.: NEVER TRUE

## 2024-04-26 SDOH — ECONOMIC STABILITY: FOOD INSECURITY: WITHIN THE PAST 12 MONTHS, YOU WORRIED THAT YOUR FOOD WOULD RUN OUT BEFORE YOU GOT MONEY TO BUY MORE.: NEVER TRUE

## 2024-04-26 ASSESSMENT — ENCOUNTER SYMPTOMS
LOSS OF SENSATION IN FEET: 0
DEPRESSION: 0
OCCASIONAL FEELINGS OF UNSTEADINESS: 0

## 2024-04-26 ASSESSMENT — PAIN - FUNCTIONAL ASSESSMENT: PAIN_FUNCTIONAL_ASSESSMENT: 0-10

## 2024-04-26 ASSESSMENT — PAIN SCALES - GENERAL: PAINLEVEL_OUTOF10: 0 - NO PAIN

## 2024-04-26 ASSESSMENT — PAIN DESCRIPTION - DESCRIPTORS: DESCRIPTORS: ACHING;TIGHTNESS

## 2024-04-26 NOTE — PROGRESS NOTES
Physical Therapy Evaluation    Patient Name: Ana Bustillos  MRN: [id number]  : 1960   Today's Date: 2024  Time Calculation  Start Time: 845  Stop Time: 938  Time Calculation (min): 53 min  PT Evaluation Time Entry  PT Evaluation (Low) Time Entry: 40  PT Therapeutic Procedures Time Entry  Therapeutic Exercise Time Entry: 13         Current Problem  1. Primary osteoarthritis of right hip  Referral to Physical Therapy    Follow Up In Physical Therapy      2. Pain of right hip        3. Right hip pain            Subjective   General:  General  Reason for Referral: hip pain  Referred By: Desiree BUTLER  Past Medical History Relevant to Rehab: H/O kidney CA  Preferred Learning Style: kinesthetic, verbal, visual, written  Pt is a 64 yo female that reports having hip pain on and off for the last year,  continuous for the last few months.  Pt c/o difficulty getting up down stairs, has to use the railing to assist, pain is in the groin an lateral right hip, no problems with sitting or rolling. 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 Pt states that her hip feels tight, has to wait a second before moving after sit to stand  Precautions:  Precautions  STEADI Fall Risk Score (The score of 4 or more indicates an increased risk of falling): 0    Pain:  Pain Assessment: 0-10  Pain Score: 0 - No pain  Pain Type: Chronic pain  Pain Location: Hip  Pain Orientation: Right  Pain Descriptors: Aching, Tightness  Pain Frequency:  (Frequent)   Current: 0/10   Best: 0/10   Worst: 4/10    Home Livin steps to enter, ranch style home-  dtrs house when babysitting is 2 story  Prior Function Per Pt/Caregiver Report:  Independent    Objective      Sensation:  Intact and symmetrical to light touch    Strength:   Measured with seated MMT'ing, WNL's with exception of:  Hip Flexion:   Right = 4/5   Left= 4+/5  Hip Abduction:  Right= 4/5  Left 5/5    Flexibility:   Hamstring (SLR):  Right = 76        Left =  66   Degrees   Piriformis  Right: taut and pain in groin    Palpation:   Neg for TTP    Special Tests:   Fabers: Positive on Right   Leg Length:    Hook-lying: Left tib and femur long    Supine:  Left long    Gait:   Pt ambulates independently without Assistive Device, mild antalgia    Outcome Measures:    Lower Extremity Funtional Score (LEFS): 50    Treatment:                                         Date: 4/26                                           VISIT# #1 #   EXERCISES REPS REPS        Nu Step          HS Str     Hip Flexor Str                                             Manual:      Lat hip mob/ MWM Next     Right LE distraction Next    HEP        OP EDUCATION:  Outpatient Education  Individual(s) Educated: Patient  Education Provided: Home Exercise Program, Physiology, POC  Risk and Benefits Discussed with Patient/Caregiver/Other: yes  Patient/Caregiver Demonstrated Understanding: yes  Plan of Care Discussed and Agreed Upon: yes  Patient Response to Education: Patient/Caregiver Asked Appropriate Questions, Patient/Caregiver Performed Return Demonstration of Exercises/Activities, Patient/Caregiver Verbalized Understanding of Information  Assessment   PT Assessment  PT Assessment Results: Decreased strength, Decreased range of motion, Decreased mobility, Pain  Rehab Prognosis: Good      Plan  Treatment/Interventions: Aquatic therapy, Cryotherapy, Education/ Instruction, Neuromuscular re-education, Therapeutic exercises, Therapeutic activities, Manual therapy  PT Plan: Skilled PT  PT Frequency: 2 times per week  Duration: 6-8 wks    Goals:  Active       Hip Pain       1. Pt will demonstrate independence with HEP to reinforce gains made in treatment        Start:  04/26/24    Expected End:  05/21/24            2. Pt will report having a decrease in pain to 2/10 at its worst for increased  tolerance for standing activities        Start:  04/26/24    Expected End:  05/21/24             3. Pt will have an increase in right hip strength by 1/3 MMT grade to decrease difficulty with climbing stairs        Start:  04/26/24    Expected End:  06/11/24       Pt will climb 1 flight (13 steps) of stairs using single rail, with minimal difficulty and without increase in pain         4. Pt will perform sit to stand with out difficulty and confidence with initial stand       Start:  04/26/24    Expected End:  06/11/24

## 2024-05-01 ENCOUNTER — TREATMENT (OUTPATIENT)
Dept: PHYSICAL THERAPY | Facility: HOSPITAL | Age: 64
End: 2024-05-01
Payer: MEDICARE

## 2024-05-01 DIAGNOSIS — M16.11 PRIMARY OSTEOARTHRITIS OF RIGHT HIP: ICD-10-CM

## 2024-05-01 DIAGNOSIS — M25.551 PAIN OF RIGHT HIP: Primary | ICD-10-CM

## 2024-05-01 PROCEDURE — 97140 MANUAL THERAPY 1/> REGIONS: CPT | Mod: GP,CQ

## 2024-05-01 PROCEDURE — 97110 THERAPEUTIC EXERCISES: CPT | Mod: GP,CQ

## 2024-05-01 ASSESSMENT — PAIN SCALES - GENERAL: PAINLEVEL_OUTOF10: 2

## 2024-05-01 ASSESSMENT — PAIN - FUNCTIONAL ASSESSMENT: PAIN_FUNCTIONAL_ASSESSMENT: 0-10

## 2024-05-01 NOTE — PROGRESS NOTES
"Physical Therapy    Physical Therapy Treatment    Patient Name: Ana Bustillos  MRN: [id number]  Today's Date: 5/1/2024  Time Calculation  Start Time: 0315  Stop Time: 0355  Time Calculation (min): 40 min    PT Therapeutic Procedures Time Entry  Manual Therapy Time Entry: 8  Therapeutic Exercise Time Entry: 32        VISIT# 2    Current Problem  1. Pain of right hip        2. Primary osteoarthritis of right hip  Follow Up In Physical Therapy          Subjective   Pt has some pain but reports stretches are helping.       Precautions  Precautions  STEADI Fall Risk Score (The score of 4 or more indicates an increased risk of falling): 0       Pain  Pain Assessment: 0-10  Pain Score: 2  Pain Type: Chronic pain  Pain Location: Hip  Pain Orientation: Right       Objective   Started land based therapy    Access Code: [email or web address]  URL: [email or web address]  Date: 05/01/2024  Prepared by: Teofilo Nick    Exercises  - Standing Hip Flexor Stretch  - 2-3 x daily - 5-7 x weekly - 1 sets - 3 reps - 30 hold  - Seated Hamstring stretch both legs  - 2-3 x daily - 5-7 x weekly - 1 sets - 3 reps - 30 hold  - Supine Figure 4 Piriformis Stretch  - 2-3 x daily - 5-7 x weekly - 1 sets - 3 reps - 30 hold      Treatments:                                         Date: 4/26 5/1/24                                          VISIT# #1 #2   EXERCISES REPS REPS        Nu Step  10' @L1        HS Str  30\" x 3 ea   Hip Flexor Str  30\" x 3 ea   RB str   30\" x 3 ea   Piriformis str  30\" x 3                                 Manual:      Lat hip mob/ MWM Next     Right LE distraction Next 8'   HEP         Assessment:   Pt reports after therapy her pain is about 1/10 or less.  Pt reports R LE distraction feels good.        Plan:Cont to improve flexibility and decrease pain.   OP PT Plan  Treatment/Interventions: Aquatic therapy, Cryotherapy, Education/ Instruction, Neuromuscular re-education, Therapeutic exercises, Therapeutic " activities, Manual therapy    Goals:  Active       Hip Pain       1. Pt will demonstrate independence with HEP to reinforce gains made in treatment        Start:  04/26/24    Expected End:  05/21/24            2. Pt will report having a decrease in pain to 2/10 at its worst for increased tolerance for standing activities        Start:  04/26/24    Expected End:  05/21/24             3. Pt will have an increase in right hip strength by 1/3 MMT grade to decrease difficulty with climbing stairs        Start:  04/26/24    Expected End:  06/11/24       Pt will climb 1 flight (13 steps) of stairs using single rail, with minimal difficulty and without increase in pain         4. Pt will perform sit to stand with out difficulty and confidence with initial stand       Start:  04/26/24    Expected End:  06/11/24

## 2024-05-03 ENCOUNTER — TREATMENT (OUTPATIENT)
Dept: PHYSICAL THERAPY | Facility: HOSPITAL | Age: 64
End: 2024-05-03
Payer: MEDICARE

## 2024-05-03 DIAGNOSIS — M16.11 PRIMARY OSTEOARTHRITIS OF RIGHT HIP: ICD-10-CM

## 2024-05-03 PROCEDURE — 97110 THERAPEUTIC EXERCISES: CPT | Mod: GP,CQ | Performed by: PHYSICAL THERAPY ASSISTANT

## 2024-05-03 PROCEDURE — 97140 MANUAL THERAPY 1/> REGIONS: CPT | Mod: GP,CQ | Performed by: PHYSICAL THERAPY ASSISTANT

## 2024-05-03 ASSESSMENT — PAIN SCALES - GENERAL: PAINLEVEL_OUTOF10: 2

## 2024-05-03 ASSESSMENT — PAIN - FUNCTIONAL ASSESSMENT: PAIN_FUNCTIONAL_ASSESSMENT: 0-10

## 2024-05-03 NOTE — PROGRESS NOTES
"Physical Therapy    Physical Therapy Treatment    Patient Name: Ana Bustillos  MRN: [id number]  Today's Date: 5/3/2024  Time Calculation  Start Time: 0900  Stop Time: 0944  Time Calculation (min): 44 min    PT Therapeutic Procedures Time Entry  Manual Therapy Time Entry: 8  Therapeutic Exercise Time Entry: 36        VISIT# 3    Current Problem  1. Primary osteoarthritis of right hip  Follow Up In Physical Therapy          Subjective   Pt reports HEP is beneficial. Reports stiffness only in right hip.      Precautions          Pain  Pain Assessment: 0-10  Pain Score: 2  Pain Type: Chronic pain  Pain Location: Hip  Pain Orientation: Right       Objective   Standing hip abd and hip flex added to HEP  [email or web address]    Educated in front of mirror re: proper gait pattern, LLE excessive add in R stance phase.  Treatments:                                         Date: 4/26 5/1/24 5/3/24                                          VISIT# #1 #2 #3   EXERCISES REPS REPS          Nu Step  10' @L1 L2 x 10'          HS Str  30\" x 3 ea 30\" x 3 ea   Hip Flexor Str  30\" x 3 ea 30\" x 3 ea   RB str   30\" x 3 ea 30\" x 3    Piriformis str  30\" x 3 30\" x 3 ea               Hip abd   2 x 10   Hip flex   2 x 10                Manual:       Lat hip mob/ MWM Next      Right LE distraction Next 8' 8' combined   HEP          Assessment:   Pt reports after therapy her pain is about 1/10 or less.  Pt reports R LE distraction feels good.        Plan:Cont to improve flexibility and decrease pain.        Goals:  Active       Hip Pain       1. Pt will demonstrate independence with HEP to reinforce gains made in treatment        Start:  04/26/24    Expected End:  05/21/24            2. Pt will report having a decrease in pain to 2/10 at its worst for increased tolerance for standing activities        Start: "  04/26/24    Expected End:  05/21/24             3. Pt will have an increase in right hip strength by 1/3 MMT grade to decrease difficulty with climbing stairs        Start:  04/26/24    Expected End:  06/11/24       Pt will climb 1 flight (13 steps) of stairs using single rail, with minimal difficulty and without increase in pain         4. Pt will perform sit to stand with out difficulty and confidence with initial stand       Start:  04/26/24    Expected End:  06/11/24

## 2024-05-06 ENCOUNTER — TREATMENT (OUTPATIENT)
Dept: PHYSICAL THERAPY | Facility: HOSPITAL | Age: 64
End: 2024-05-06
Payer: MEDICARE

## 2024-05-06 DIAGNOSIS — M16.11 PRIMARY OSTEOARTHRITIS OF RIGHT HIP: ICD-10-CM

## 2024-05-06 PROCEDURE — 97110 THERAPEUTIC EXERCISES: CPT | Mod: GP,CQ

## 2024-05-06 PROCEDURE — 97140 MANUAL THERAPY 1/> REGIONS: CPT | Mod: GP,CQ

## 2024-05-06 NOTE — PROGRESS NOTES
"Physical Therapy    Physical Therapy Treatment    Patient Name: Ana Bustillos  MRN: [id number]  : 1960   Today's Date: 2024  Time Calculation  Start Time: 0300  Stop Time: 345  Time Calculation (min): 45 min  Visit # 3     PT Therapeutic Procedures Time Entry  Manual Therapy Time Entry: 8  Therapeutic Exercise Time Entry: 37          Current Problem  1. Primary osteoarthritis of right hip  Follow Up In Physical Therapy            Subjective   Pt reports she has some pain, was busy this weekend with watching multiple sport games over the weekend.        Precautions     STEADI Fall Risk Score (The score of 4 or more indicates an increased risk of falling): 0      Pain   2/10  Objective    Added exercises       Treatments:                                         Date: 4/26 5/1/24 5/3/24 5/6/24                                          VISIT# #1 #2 #3 #4   EXERCISES REPS REPS            Nu Step  10' @L1 L2 x 10'  L2 x 10'           HS Str  30\" x 3 ea 30\" x 3 ea 30\" x 3 ea   Hip Flexor Str  30\" x 3 ea 30\" x 3 ea HEP   RB str   30\" x 3 ea 30\" x 3  30\" x 3    Piriformis str  30\" x 3 30\" x 3 ea HEP                 Hip abd   2 x 10    Hip flex   2 x 10            Step ups fwd    4 in x15   lat    4 in x15          Shuttle DLP    6B 2 x 10               SLP    5B 2 x 10 ea               DTR    5B 2 x 10   Manual:        Lat hip mob/ MWM Next       Right LE distraction Next 8' 8' combined 8'   HEP         Assessment:  Pt reports LE leg distraction feels good. No increased pain with added exercises. Pt states she is doing HEP 2x daily.        Plan:   Cont with POC to decrease pain in R hip     OP EDUCATION:       Goals:  Active       Hip Pain       1. Pt will demonstrate independence with HEP to reinforce gains made in treatment        Start:  24    Expected End:  24            2. Pt will report having a decrease in pain to 2/10 at its worst for increased tolerance for standing activities        Start:  " 04/26/24    Expected End:  05/21/24             3. Pt will have an increase in right hip strength by 1/3 MMT grade to decrease difficulty with climbing stairs        Start:  04/26/24    Expected End:  06/11/24       Pt will climb 1 flight (13 steps) of stairs using single rail, with minimal difficulty and without increase in pain         4. Pt will perform sit to stand with out difficulty and confidence with initial stand       Start:  04/26/24    Expected End:  06/11/24

## 2024-05-10 ENCOUNTER — TREATMENT (OUTPATIENT)
Dept: PHYSICAL THERAPY | Facility: HOSPITAL | Age: 64
End: 2024-05-10
Payer: MEDICARE

## 2024-05-10 DIAGNOSIS — M16.11 PRIMARY OSTEOARTHRITIS OF RIGHT HIP: ICD-10-CM

## 2024-05-10 PROCEDURE — 97110 THERAPEUTIC EXERCISES: CPT | Mod: GP

## 2024-05-10 PROCEDURE — 97140 MANUAL THERAPY 1/> REGIONS: CPT | Mod: GP

## 2024-05-10 ASSESSMENT — PAIN SCALES - GENERAL: PAINLEVEL_OUTOF10: 1

## 2024-05-10 ASSESSMENT — PAIN - FUNCTIONAL ASSESSMENT: PAIN_FUNCTIONAL_ASSESSMENT: 0-10

## 2024-05-10 NOTE — PROGRESS NOTES
"Physical Therapy    Physical Therapy Treatment    Patient Name: Ana Bustillos  MRN: [id number]  : 1960   Today's Date: 5/10/2024  Time Calculation  Start Time: 0150 (Late)  Stop Time: 228  Time Calculation (min): 38 min  PT Therapeutic Procedures Time Entry  Manual Therapy Time Entry: 13  Therapeutic Exercise Time Entry: 25     Visit # 5  Insurance:          Current Problem  1. Primary osteoarthritis of right hip  Follow Up In Physical Therapy            Subjective   Pt reports that her hip feels pretty good right now, rates at about a 1 upon arrival but does vary some with activity     Precautions  Precautions  STEADI Fall Risk Score (The score of 4 or more indicates an increased risk of falling): 0       Pain  Pain Assessment: 0-10  Pain Score: 1  Pain Type: Chronic pain  Pain Location: Hip  Pain Orientation: Right    Objective            Treatments:                                      Date: 5/1/24 5/3/24 5/6/24 5/10                                       VISIT# #2 #3 #4 5   EXERCISES REPS             Nu Step 10' @L1 L2 x 10'  L2 x 10'  L2 x 10'          HS Str 30\" x 3 ea 30\" x 3 ea 30\" x 3 ea    Hip Flexor Str 30\" x 3 ea 30\" x 3 ea HEP    RB str  30\" x 3 ea 30\" x 3  30\" x 3  3 x 30\"   Piriformis str 30\" x 3 30\" x 3 ea HEP               Q-Hip   Hip abd  2 x 10  2 x 10 x 10#   Hip flex  2 x 10   2 x 10 x 10#          Step ups fwd   4 in x15    lat   4 in x15           Shuttle DLP   6B 2 x 10                SLP   5B 2 x 10 ea                DTR   5B 2 x 10    Manual:    Piriformis str w/wo distract    Lat hip mob/ MWM    x    Right LE distraction 8' 8' combined 8' x   HEP         Assessment:  Pt tolerated treatment without complaint of increase in symptoms, responding to treatment with decrease in pain, progressing with LE strengthening        Plan:   Progress with strengthening and joint mobility    OP EDUCATION:  Outpatient Education  Education Provided: Home Exercise Program    Goals:  Active       Hip " Pain       1. Pt will demonstrate independence with HEP to reinforce gains made in treatment  (Met)       Start:  04/26/24    Expected End:  05/21/24    Resolved:  05/10/24         2. Pt will report having a decrease in pain to 2/10 at its worst for increased tolerance for standing activities  (Progressing)       Start:  04/26/24    Expected End:  05/21/24             3. Pt will have an increase in right hip strength by 1/3 MMT grade to decrease difficulty with climbing stairs  (Progressing)       Start:  04/26/24    Expected End:  06/11/24       Pt will climb 1 flight (13 steps) of stairs using single rail, with minimal difficulty and without increase in pain         4. Pt will perform sit to stand with out difficulty and confidence with initial stand (Progressing)       Start:  04/26/24    Expected End:  06/11/24                 .

## 2024-05-13 ENCOUNTER — TREATMENT (OUTPATIENT)
Dept: PHYSICAL THERAPY | Facility: HOSPITAL | Age: 64
End: 2024-05-13
Payer: MEDICARE

## 2024-05-13 DIAGNOSIS — M16.11 PRIMARY OSTEOARTHRITIS OF RIGHT HIP: ICD-10-CM

## 2024-05-13 PROCEDURE — 97110 THERAPEUTIC EXERCISES: CPT | Mod: GP

## 2024-05-13 PROCEDURE — 97140 MANUAL THERAPY 1/> REGIONS: CPT | Mod: GP

## 2024-05-13 ASSESSMENT — PAIN SCALES - GENERAL: PAINLEVEL_OUTOF10: 2

## 2024-05-13 ASSESSMENT — PAIN - FUNCTIONAL ASSESSMENT: PAIN_FUNCTIONAL_ASSESSMENT: 0-10

## 2024-05-13 NOTE — PROGRESS NOTES
"Physical Therapy    Physical Therapy Treatment    Patient Name: Ana Bustillos  MRN: [id number]  : 1960   Today's Date: 2024  Time Calculation  Start Time: 010  Stop Time: 0140  Time Calculation (min): 40 min  PT Therapeutic Procedures Time Entry  Manual Therapy Time Entry: 10  Therapeutic Exercise Time Entry: 30     Visit # 6  Insurance:          Current Problem  1. Primary osteoarthritis of right hip  Follow Up In Physical Therapy            Subjective   Pt states that she felt good after last visit, rates pain at 2/10 upon arrival, states that she felt some right hip pain & tightness with having to ambulate down a hill  to get to her grand-kids soccer game resolved this weekend, resolved immediately once on level ground     Precautions  Precautions  STEADI Fall Risk Score (The score of 4 or more indicates an increased risk of falling): 0       Pain  Pain Assessment: 0-10  Pain Score: 2  Pain Type: Chronic pain  Pain Location: Hip  Pain Orientation: Right    Objective      Initiation of DLP on SB. Increased sets with double leg press on shuttle to increase strength,.    Treatments:                                        Date: 5/1/24 5/3/24 5/6/24 5/10 5/13                                       VISIT# #2 #3 #4 5 6   EXERCISES REPS               Nu Step 10' @L1 L2 x 10'  L2 x 10'  L2 x 10' L2 x 10           HS Str 30\" x 3 ea 30\" x 3 ea 30\" x 3 ea     Hip Flexor Str 30\" x 3 ea 30\" x 3 ea HEP     RB str  30\" x 3 ea 30\" x 3  30\" x 3  3 x 30\" 3 x 30\"   Piriformis str 30\" x 3 30\" x 3 ea HEP             DKTC on SB     2 x 10               Q-Hip    Hip abd  2 x 10  2 x 10 x 10# 2 x 10 x10#   Hip flex  2 x 10   2 x 10 x 10# 2 x 10 x 10#   Hip Extension        Step ups fwd   4 in x15     lat   4 in x15             Shuttle DLP   6B 2 x 10  6B 3 x 10                SLP   5B 2 x 10 ea  4B 2 x 10               DTR   5B 2 x 10  4B 3 x 10   Manual:    Piriformis str w/wo distract     Lat hip mob/ MWM    x x    Right " LE distraction 8' 8' combined 8' x x   HEP          Assessment:  Pt tolerated treatment without complaint of increase in symptoms, responding to treatment with increased tolerance for closed chain activities, progressing with LE strengthening        Plan:     Progress with joint mobilization and LE strengthening   OP EDUCATION:       Goals:  Active       Hip Pain       1. Pt will demonstrate independence with HEP to reinforce gains made in treatment  (Met)       Start:  04/26/24    Expected End:  05/21/24    Resolved:  05/10/24         2. Pt will report having a decrease in pain to 2/10 at its worst for increased tolerance for standing activities  (Progressing)       Start:  04/26/24    Expected End:  05/21/24             3. Pt will have an increase in right hip strength by 1/3 MMT grade to decrease difficulty with climbing stairs  (Progressing)       Start:  04/26/24    Expected End:  06/11/24       Pt will climb 1 flight (13 steps) of stairs using single rail, with minimal difficulty and without increase in pain         4. Pt will perform sit to stand with out difficulty and confidence with initial stand (Progressing)       Start:  04/26/24    Expected End:  06/11/24                 .

## 2024-05-17 ENCOUNTER — TREATMENT (OUTPATIENT)
Dept: PHYSICAL THERAPY | Facility: HOSPITAL | Age: 64
End: 2024-05-17
Payer: MEDICARE

## 2024-05-17 DIAGNOSIS — M25.551 PAIN OF RIGHT HIP: ICD-10-CM

## 2024-05-17 DIAGNOSIS — M25.551 RIGHT HIP PAIN: ICD-10-CM

## 2024-05-17 DIAGNOSIS — M16.11 PRIMARY OSTEOARTHRITIS OF RIGHT HIP: Primary | ICD-10-CM

## 2024-05-17 PROCEDURE — 97110 THERAPEUTIC EXERCISES: CPT | Mod: GP,CQ

## 2024-05-17 ASSESSMENT — PAIN SCALES - GENERAL: PAINLEVEL_OUTOF10: 1

## 2024-05-17 ASSESSMENT — PAIN - FUNCTIONAL ASSESSMENT: PAIN_FUNCTIONAL_ASSESSMENT: 0-10

## 2024-05-17 NOTE — PROGRESS NOTES
"Physical Therapy    Physical Therapy Treatment    Patient Name: Ana Bustillos  MRN: [id number]  Today's Date: 5/17/2024  Time Calculation  Start Time: 1145  Stop Time: 1225  Time Calculation (min): 40 min    PT Therapeutic Procedures Time Entry  Therapeutic Exercise Time Entry: 40          VISIT:# 7  Current Problem  1. Primary osteoarthritis of right hip  Follow Up In Physical Therapy      2. Pain of right hip        3. Right hip pain            Subjective   No recent falls, pt is indp with Hep      Precautions  Precautions  STEADI Fall Risk Score (The score of 4 or more indicates an increased risk of falling): 0  Precautions Comment: none       Pain  Pain Assessment: 0-10  Pain Score: 1  Pain Type: Chronic pain  Pain Location: Hip  Pain Orientation: Right       Objective     Other Measures  Lower Extremity Funtional Score (LEFS): 58    Up and down a full flight of stairs alternating with 1 HRA     Access Code: [email or web address]  URL: [email or web address]  Date: 05/17/2024  Prepared by: Teofilo Nick    Exercises  - Standing Hip Flexor Stretch  - 1-2 x daily - 5-7 x weekly - 1 sets - 3 reps - 30 hold  - Seated Hamstring stretch both legs  - 1-2 x daily - 5-7 x weekly - 1 sets - 3 reps - 30 hold  - Seated Figure 4 Piriformis Stretch (Mirrored)  - 1-2 x daily - 5-7 x weekly - 1 sets - 3 reps - 30 hold  - Standing Gastroc Stretch on Step with Counter Support  - 1-2 x daily - 5-7 x weekly - 1 sets - 3 reps - 30 hold    Treatments:                                      Date: 5/10 5/13 5/17/2024                                         VISIT# 5 6 7    EXERCISES              Nu Step L2 x 10' L2 x 10 10' @L2           HS Str   30\" x 3 HEP   Hip Flexor Str   30\" x 3 HEP   RB str  3 x 30\" 3 x 30\" 30\" x 3 HEP   Piriformis str   30\" x 3 HEP          DKTC on SB  2 x 10             Q-Hip  Qhip    Hip abd 2 x 10 x 10# 2 x 10 x10# 15# 2 x 10     Hip flex 2 x 10 x 10# 2 x 10 x 10# 15# 2 x 10     Hip Extension   15# " 2 x 10     Step ups fwd       lat              Shuttle DLP  6B 3 x 10                  SLP  4B 2 x 10                 DTR  4B 3 x 10     Manual: Piriformis str w/wo distract       Lat hip mob/ MWM x x      Right LE distraction x x        Full flight of stairs x 2    HEP   LEFS         Assessment: Pt has improved her LEFS score.  Pt is able to walk up and down a full flight of stairs with 1 HRA, however she looks better and is quicker using 2 HRA.  Pt understands new stretches.      Outpatient Education  Individual(s) Educated: Patient  Education Provided: Home Exercise Program  Risk and Benefits Discussed with Patient/Caregiver/Other: yes  Patient/Caregiver Demonstrated Understanding: yes  Plan of Care Discussed and Agreed Upon: yes  Patient Response to Education: Patient/Caregiver Verbalized Understanding of Information, Patient/Caregiver Performed Return Demonstration of Exercises/Activities, Patient/Caregiver Asked Appropriate Questions  Education Comment: Access Code: [email or web address]    Plan: Recheck soon   OP PT Plan  Treatment/Interventions: Aquatic therapy, Cryotherapy, Education/ Instruction, Neuromuscular re-education, Therapeutic exercises, Therapeutic activities, Manual therapy  PT Plan: Skilled PT  PT Frequency: 2 times per week  Duration: 6-8 wks    Goals:  Active       Hip Pain       1. Pt will demonstrate independence with HEP to reinforce gains made in treatment  (Met)       Start:  04/26/24    Expected End:  05/21/24    Resolved:  05/10/24         2. Pt will report having a decrease in pain to 2/10 at its worst for increased tolerance for standing activities  (Progressing)       Start:  04/26/24    Expected End:  05/21/24             3. Pt will have an increase in right hip strength by 1/3 MMT grade to decrease difficulty with climbing stairs  (Progressing)       Start:  04/26/24    Expected End:  06/11/24       Pt will climb 1 flight (13 steps) of stairs using single rail, with minimal difficulty and  without increase in pain         4. Pt will perform sit to stand with out difficulty and confidence with initial stand (Progressing)       Start:  04/26/24    Expected End:  06/11/24

## 2024-05-20 ENCOUNTER — TREATMENT (OUTPATIENT)
Dept: PHYSICAL THERAPY | Facility: HOSPITAL | Age: 64
End: 2024-05-20
Payer: MEDICARE

## 2024-05-20 DIAGNOSIS — M16.11 PRIMARY OSTEOARTHRITIS OF RIGHT HIP: ICD-10-CM

## 2024-05-20 PROCEDURE — 97110 THERAPEUTIC EXERCISES: CPT | Mod: GP

## 2024-05-20 PROCEDURE — 97140 MANUAL THERAPY 1/> REGIONS: CPT | Mod: GP

## 2024-05-20 ASSESSMENT — PAIN - FUNCTIONAL ASSESSMENT: PAIN_FUNCTIONAL_ASSESSMENT: 0-10

## 2024-05-20 ASSESSMENT — PAIN SCALES - GENERAL: PAINLEVEL_OUTOF10: 0 - NO PAIN

## 2024-05-20 NOTE — PROGRESS NOTES
"Physical Therapy    Physical Therapy Treatment    Patient Name: Ana Bustillos  MRN: [id number]  : 1960   Today's Date: 2024  Time Calculation  Start Time: 100  Stop Time: 014  Time Calculation (min): 40 min  PT Therapeutic Procedures Time Entry  Manual Therapy Time Entry: 10  Therapeutic Exercise Time Entry: 30     Visit # 8  Insurance:          Current Problem  1. Primary osteoarthritis of right hip  Follow Up In Physical Therapy            Subjective   Pt reports no new complaints, denies falling since last visit, states that her hip is tight today but denies pain upon arrival.        Precautions  Precautions  STEADI Fall Risk Score (The score of 4 or more indicates an increased risk of falling): 0       Pain  Pain Assessment: 0-10  Pain Score: 0 - No pain  Pain Type: Chronic pain  Pain Location: Hip  Pain Orientation: Right    Objective          Treatments:                                      Date: 5/10 5/13 2024  5/20                                       VISIT# 5 6 7 8   EXERCISES              Nu Step L2 x 10' L2 x 10 10' @L2 10' @L3          HS Str   30\" x 3 HEP   Hip Flexor Str   30\" x 3 HEP   RB str  3 x 30\" 3 x 30\" 30\" x 3 HEP   Piriformis str   30\" x 3 HEP          DKTC on SB  2 x 10             Q-Hip  Qhip    Hip abd 2 x 10 x 10# 2 x 10 x10# 15# 2 x 10  2 x 10 x 15#   Hip flex 2 x 10 x 10# 2 x 10 x 10# 15# 2 x 10  2 x 10 x 15#   Hip Extension   15# 2 x 10  2 x 10 x 15#   Step ups fwd       lat              Shuttle DLP  6B 3 x 10   6B 3 x 10               SLP  4B 2 x 10  5B 2 x 10               DTR  4B 3 x 10     Manual: Piriformis str w/wo distract       Lat hip mob/ MWM x x  x    Right LE distraction x x  x      Full flight of stairs x 2    HEP   LEFS       Assessment:  Pt tolerated treatment without complaint of increase in symptoms, responding to treatment with decrease in pain and increase in activity tolerance, progressing with LE strengthening         Plan:   Discharge next " visit    OP EDUCATION:       Goals:  Active       Hip Pain       1. Pt will demonstrate independence with HEP to reinforce gains made in treatment  (Met)       Start:  04/26/24    Expected End:  05/21/24    Resolved:  05/10/24         2. Pt will report having a decrease in pain to 2/10 at its worst for increased tolerance for standing activities  (Progressing)       Start:  04/26/24    Expected End:  05/21/24             3. Pt will have an increase in right hip strength by 1/3 MMT grade to decrease difficulty with climbing stairs  (Progressing)       Start:  04/26/24    Expected End:  06/11/24       Pt will climb 1 flight (13 steps) of stairs using single rail, with minimal difficulty and without increase in pain         4. Pt will perform sit to stand with out difficulty and confidence with initial stand (Progressing)       Start:  04/26/24    Expected End:  06/11/24                 .

## 2024-05-23 ENCOUNTER — TREATMENT (OUTPATIENT)
Dept: PHYSICAL THERAPY | Facility: HOSPITAL | Age: 64
End: 2024-05-23
Payer: MEDICARE

## 2024-05-23 DIAGNOSIS — M16.11 PRIMARY OSTEOARTHRITIS OF RIGHT HIP: Primary | ICD-10-CM

## 2024-05-23 PROCEDURE — 97110 THERAPEUTIC EXERCISES: CPT | Mod: GP

## 2024-05-23 ASSESSMENT — PAIN SCALES - GENERAL: PAINLEVEL_OUTOF10: 0 - NO PAIN

## 2024-05-23 ASSESSMENT — PAIN - FUNCTIONAL ASSESSMENT: PAIN_FUNCTIONAL_ASSESSMENT: 0-10

## 2024-05-23 NOTE — PROGRESS NOTES
"Physical Therapy    Physical Therapy Treatment    Patient Name: Ana Bustillos  MRN: [id number]  : 1960   Today's Date: 2024  Time Calculation  Start Time: 545  Stop Time: 625  Time Calculation (min): 40 min  PT Therapeutic Procedures Time Entry  Therapeutic Exercise Time Entry: 40     Visit # 9  Insurance:          Current Problem  1. Primary osteoarthritis of right hip [M16.11]              Subjective          Precautions  Precautions  STEADI Fall Risk Score (The score of 4 or more indicates an increased risk of falling): 0       Pain  Pain Assessment: 0-10  Pain Score: 0 - No pain  Pain Type: Chronic pain  Pain Location: Hip  Pain Orientation: Right    Objective      Strength: Measured with seated MMT'ing,   Right hip: flexion =4+/5  Abduction = 4+/5, all other motions WNL's    Outcome Measures:  Lower Extremity Funtional Score (LEFS): 58    Treatments:                                      Date: 5/10 5/13 2024  5/20 5/23                                       VISIT# 5 6 7 8 9   EXERCISES                Nu Step L2 x 10' L2 x 10 10' @L2 10' @L3 10' @ L#           HS Str   30\" x 3 HEP    Hip Flexor Str   30\" x 3 HEP    RB str  3 x 30\" 3 x 30\" 30\" x 3 HEP    Piriformis str   30\" x 3 HEP            DKTC on SB  2 x 10               Q-Hip  Qhip     Hip abd 2 x 10 x 10# 2 x 10 x10# 15# 2 x 10  2 x 10 x 15#    Hip flex 2 x 10 x 10# 2 x 10 x 10# 15# 2 x 10  2 x 10 x 15#    Hip Extension   15# 2 x 10  2 x 10 x 15#    Step ups fwd        lat                Shuttle DLP  6B 3 x 10   6B 3 x 10                SLP  4B 2 x 10  5B 2 x 10                DTR  4B 3 x 10      Manual: Piriformis str w/wo distract        Lat hip mob/ MWM x x  x x    Right LE distraction x x  x x      Full flight of stairs x 2     HEP   LEFS   3 way hip and TKE with t-band     Assessment:  Pt has responded to treatment with increase in strength, decrease in pain and increase in functional activity tolerance indicated by improvement in " LEFS score.  Pt continues to have some intermittent hip pain but is independent with HEP and agreeable to discharge at this time       Plan:   Discharge to HEP    OP EDUCATION:   3 way standing hip with t-band   TKE with T-band    Goals:  Active       Hip Pain       1. Pt will demonstrate independence with HEP to reinforce gains made in treatment  (Met)       Start:  04/26/24    Expected End:  05/21/24    Resolved:  05/10/24         2. Pt will report having a decrease in pain to 2/10 at its worst for increased tolerance for standing activities  (Progressing)       Start:  04/26/24    Expected End:  05/21/24             3. Pt will have an increase in right hip strength by 1/3 MMT grade to decrease difficulty with climbing stairs  (Progressing)       Start:  04/26/24    Expected End:  06/11/24       Pt will climb 1 flight (13 steps) of stairs using single rail, with minimal difficulty and without increase in pain         4. Pt will perform sit to stand with out difficulty and confidence with initial stand (Progressing)       Start:  04/26/24    Expected End:  06/11/24                 .

## 2024-05-24 ENCOUNTER — APPOINTMENT (OUTPATIENT)
Dept: PHYSICAL THERAPY | Facility: HOSPITAL | Age: 64
End: 2024-05-24
Payer: MEDICARE

## 2024-06-13 ENCOUNTER — TELEPHONE (OUTPATIENT)
Dept: PRIMARY CARE | Facility: CLINIC | Age: 64
End: 2024-06-13
Payer: MEDICARE

## 2024-06-13 ENCOUNTER — HOSPITAL ENCOUNTER (OUTPATIENT)
Dept: RADIOLOGY | Facility: HOSPITAL | Age: 64
Discharge: HOME | End: 2024-06-13
Payer: MEDICARE

## 2024-06-13 VITALS — BODY MASS INDEX: 37.77 KG/M2 | WEIGHT: 235 LBS | HEIGHT: 66 IN

## 2024-06-13 DIAGNOSIS — R92.8 ABNORMAL MAMMOGRAM OF RIGHT BREAST: ICD-10-CM

## 2024-06-13 PROCEDURE — 77065 DX MAMMO INCL CAD UNI: CPT | Mod: RT

## 2024-06-13 PROCEDURE — 77061 BREAST TOMOSYNTHESIS UNI: CPT | Mod: RT

## 2024-06-13 NOTE — TELEPHONE ENCOUNTER
----- Message from LUKE Mcdonald-CNS sent at 6/13/2024  3:37 PM EDT -----  Please let patient know that Mammogram is negative. Thank you!

## 2024-06-14 ENCOUNTER — PATIENT MESSAGE (OUTPATIENT)
Dept: PRIMARY CARE | Facility: CLINIC | Age: 64
End: 2024-06-14
Payer: MEDICARE

## 2024-07-06 DIAGNOSIS — I10 BENIGN HYPERTENSION: ICD-10-CM

## 2024-07-08 ENCOUNTER — APPOINTMENT (OUTPATIENT)
Dept: PRIMARY CARE | Facility: CLINIC | Age: 64
End: 2024-07-08
Payer: MEDICARE

## 2024-07-08 RX ORDER — LISINOPRIL 20 MG/1
20 TABLET ORAL DAILY
Qty: 90 TABLET | Refills: 1 | Status: SHIPPED | OUTPATIENT
Start: 2024-07-08

## 2024-07-09 ENCOUNTER — PATIENT MESSAGE (OUTPATIENT)
Dept: PRIMARY CARE | Facility: CLINIC | Age: 64
End: 2024-07-09
Payer: MEDICARE

## 2024-07-19 DIAGNOSIS — E78.5 HYPERLIPIDEMIA, UNSPECIFIED HYPERLIPIDEMIA TYPE: ICD-10-CM

## 2024-07-19 RX ORDER — OMEGA-3-ACID ETHYL ESTERS 1 G/1
2 CAPSULE, LIQUID FILLED ORAL 2 TIMES DAILY
Qty: 90 CAPSULE | Refills: 1 | Status: SHIPPED | OUTPATIENT
Start: 2024-07-19

## 2024-07-26 ENCOUNTER — LAB (OUTPATIENT)
Dept: LAB | Facility: LAB | Age: 64
End: 2024-07-26
Payer: MEDICARE

## 2024-07-26 DIAGNOSIS — E78.5 HYPERLIPIDEMIA, UNSPECIFIED HYPERLIPIDEMIA TYPE: ICD-10-CM

## 2024-07-26 DIAGNOSIS — I49.3 VENTRICULAR PREMATURE BEATS: ICD-10-CM

## 2024-07-26 DIAGNOSIS — Z12.31 VISIT FOR SCREENING MAMMOGRAM: ICD-10-CM

## 2024-07-26 DIAGNOSIS — Z00.00 WELCOME TO MEDICARE PREVENTIVE VISIT: ICD-10-CM

## 2024-07-26 DIAGNOSIS — K21.9 GASTROESOPHAGEAL REFLUX DISEASE WITHOUT ESOPHAGITIS: ICD-10-CM

## 2024-07-26 DIAGNOSIS — I10 BENIGN HYPERTENSION: ICD-10-CM

## 2024-07-26 DIAGNOSIS — I10 ESSENTIAL HYPERTENSION: ICD-10-CM

## 2024-07-26 DIAGNOSIS — E78.2 MIXED HYPERLIPIDEMIA: ICD-10-CM

## 2024-07-26 DIAGNOSIS — M85.80 OSTEOPENIA, UNSPECIFIED LOCATION: ICD-10-CM

## 2024-07-26 DIAGNOSIS — E55.9 VITAMIN D DEFICIENCY: ICD-10-CM

## 2024-07-26 LAB
25(OH)D3 SERPL-MCNC: 66 NG/ML (ref 30–100)
ALBUMIN SERPL BCP-MCNC: 4 G/DL (ref 3.4–5)
ALP SERPL-CCNC: 42 U/L (ref 33–136)
ALT SERPL W P-5'-P-CCNC: 21 U/L (ref 7–45)
ANION GAP SERPL CALC-SCNC: 13 MMOL/L (ref 10–20)
AST SERPL W P-5'-P-CCNC: 16 U/L (ref 9–39)
BILIRUB SERPL-MCNC: 0.8 MG/DL (ref 0–1.2)
BUN SERPL-MCNC: 23 MG/DL (ref 6–23)
CALCIUM SERPL-MCNC: 9.7 MG/DL (ref 8.6–10.3)
CHLORIDE SERPL-SCNC: 103 MMOL/L (ref 98–107)
CHOLEST SERPL-MCNC: 172 MG/DL (ref 0–199)
CHOLESTEROL/HDL RATIO: 4
CO2 SERPL-SCNC: 25 MMOL/L (ref 21–32)
CREAT SERPL-MCNC: 1.01 MG/DL (ref 0.5–1.05)
EGFRCR SERPLBLD CKD-EPI 2021: 62 ML/MIN/1.73M*2
ERYTHROCYTE [DISTWIDTH] IN BLOOD BY AUTOMATED COUNT: 15 % (ref 11.5–14.5)
GLUCOSE SERPL-MCNC: 104 MG/DL (ref 74–99)
HCT VFR BLD AUTO: 40.5 % (ref 36–46)
HDLC SERPL-MCNC: 42.5 MG/DL
HGB BLD-MCNC: 13.6 G/DL (ref 12–16)
LDLC SERPL CALC-MCNC: 108 MG/DL
MCH RBC QN AUTO: 30.8 PG (ref 26–34)
MCHC RBC AUTO-ENTMCNC: 33.6 G/DL (ref 32–36)
MCV RBC AUTO: 92 FL (ref 80–100)
NON HDL CHOLESTEROL: 130 MG/DL (ref 0–149)
NRBC BLD-RTO: 0 /100 WBCS (ref 0–0)
PLATELET # BLD AUTO: 276 X10*3/UL (ref 150–450)
POTASSIUM SERPL-SCNC: 4.9 MMOL/L (ref 3.5–5.3)
PROT SERPL-MCNC: 6.6 G/DL (ref 6.4–8.2)
RBC # BLD AUTO: 4.41 X10*6/UL (ref 4–5.2)
SODIUM SERPL-SCNC: 136 MMOL/L (ref 136–145)
TRIGL SERPL-MCNC: 106 MG/DL (ref 0–149)
TSH SERPL-ACNC: 1.65 MIU/L (ref 0.44–3.98)
VIT B12 SERPL-MCNC: >7500 PG/ML (ref 211–911)
VLDL: 21 MG/DL (ref 0–40)
WBC # BLD AUTO: 8.2 X10*3/UL (ref 4.4–11.3)

## 2024-07-29 ENCOUNTER — APPOINTMENT (OUTPATIENT)
Dept: PRIMARY CARE | Facility: CLINIC | Age: 64
End: 2024-07-29
Payer: MEDICARE

## 2024-07-29 VITALS
HEART RATE: 102 BPM | WEIGHT: 231 LBS | BODY MASS INDEX: 37.28 KG/M2 | SYSTOLIC BLOOD PRESSURE: 120 MMHG | DIASTOLIC BLOOD PRESSURE: 68 MMHG

## 2024-07-29 DIAGNOSIS — E55.9 VITAMIN D DEFICIENCY: ICD-10-CM

## 2024-07-29 DIAGNOSIS — M85.80 OSTEOPENIA, UNSPECIFIED LOCATION: ICD-10-CM

## 2024-07-29 DIAGNOSIS — I49.3 VENTRICULAR PREMATURE BEATS: ICD-10-CM

## 2024-07-29 DIAGNOSIS — E78.2 MIXED HYPERLIPIDEMIA: ICD-10-CM

## 2024-07-29 DIAGNOSIS — K21.9 GASTROESOPHAGEAL REFLUX DISEASE WITHOUT ESOPHAGITIS: ICD-10-CM

## 2024-07-29 DIAGNOSIS — I10 BENIGN HYPERTENSION: ICD-10-CM

## 2024-07-29 DIAGNOSIS — I10 ESSENTIAL HYPERTENSION: ICD-10-CM

## 2024-07-29 DIAGNOSIS — Z78.0 POST-MENOPAUSAL: Primary | ICD-10-CM

## 2024-07-29 DIAGNOSIS — E78.5 HYPERLIPIDEMIA, UNSPECIFIED HYPERLIPIDEMIA TYPE: ICD-10-CM

## 2024-07-29 DIAGNOSIS — R73.02 IMPAIRED GLUCOSE TOLERANCE: ICD-10-CM

## 2024-07-29 DIAGNOSIS — Z00.00 WELCOME TO MEDICARE PREVENTIVE VISIT: ICD-10-CM

## 2024-07-29 DIAGNOSIS — Z12.31 VISIT FOR SCREENING MAMMOGRAM: ICD-10-CM

## 2024-07-29 PROCEDURE — 3078F DIAST BP <80 MM HG: CPT | Performed by: CLINICAL NURSE SPECIALIST

## 2024-07-29 PROCEDURE — 1123F ACP DISCUSS/DSCN MKR DOCD: CPT | Performed by: CLINICAL NURSE SPECIALIST

## 2024-07-29 PROCEDURE — 1159F MED LIST DOCD IN RCRD: CPT | Performed by: CLINICAL NURSE SPECIALIST

## 2024-07-29 PROCEDURE — 1036F TOBACCO NON-USER: CPT | Performed by: CLINICAL NURSE SPECIALIST

## 2024-07-29 PROCEDURE — 3074F SYST BP LT 130 MM HG: CPT | Performed by: CLINICAL NURSE SPECIALIST

## 2024-07-29 PROCEDURE — 99214 OFFICE O/P EST MOD 30 MIN: CPT | Performed by: CLINICAL NURSE SPECIALIST

## 2024-07-29 PROCEDURE — 1160F RVW MEDS BY RX/DR IN RCRD: CPT | Performed by: CLINICAL NURSE SPECIALIST

## 2024-07-29 ASSESSMENT — ENCOUNTER SYMPTOMS
CONFUSION: 0
HEMATURIA: 0
DIARRHEA: 0
UNEXPECTED WEIGHT CHANGE: 0
SLEEP DISTURBANCE: 0
DEPRESSION: 0
BLOOD IN STOOL: 0
COUGH: 0
LOSS OF SENSATION IN FEET: 0
NECK PAIN: 0
HEADACHES: 0
FATIGUE: 0
DYSURIA: 0
CONSTIPATION: 0
TROUBLE SWALLOWING: 0
ARTHRALGIAS: 0
CHEST TIGHTNESS: 0
SORE THROAT: 0
FLANK PAIN: 0
DIZZINESS: 0
SHORTNESS OF BREATH: 0
ACTIVITY CHANGE: 0
BRUISES/BLEEDS EASILY: 0
EYE PAIN: 0
JOINT SWELLING: 0
BACK PAIN: 0
APPETITE CHANGE: 0
VOMITING: 0
OCCASIONAL FEELINGS OF UNSTEADINESS: 0
WOUND: 0
FEVER: 0
WHEEZING: 0
NAUSEA: 0
ABDOMINAL PAIN: 0
PALPITATIONS: 0
PHOTOPHOBIA: 0
MYALGIAS: 0
SEIZURES: 0
CHILLS: 0
POLYDIPSIA: 0

## 2024-07-29 ASSESSMENT — PATIENT HEALTH QUESTIONNAIRE - PHQ9
1. LITTLE INTEREST OR PLEASURE IN DOING THINGS: NOT AT ALL
2. FEELING DOWN, DEPRESSED OR HOPELESS: NOT AT ALL
SUM OF ALL RESPONSES TO PHQ9 QUESTIONS 1 AND 2: 0

## 2024-07-29 NOTE — PROGRESS NOTES
Subjective   Patient ID: Ana Bustillos is a 63 y.o. female who presents for Follow-up (Follow-up ).  HPI    Here today as a follow up appointment.      Follows with [facility] for Nephrology, every 6 months. Nephrectomy.      Overall feels well. Tolerating current medications.      Has been continuing to work on weight loss since last OV. Feels well.     Review of Systems   Constitutional:  Negative for activity change, appetite change, chills, fatigue, fever and unexpected weight change.   HENT:  Negative for ear pain, hearing loss, nosebleeds, sore throat, tinnitus and trouble swallowing.    Eyes:  Negative for photophobia, pain and visual disturbance.   Respiratory:  Negative for cough, chest tightness, shortness of breath and wheezing.    Cardiovascular:  Negative for chest pain, palpitations and leg swelling.   Gastrointestinal:  Negative for abdominal pain, blood in stool, constipation, diarrhea, nausea and vomiting.   Endocrine: Negative for cold intolerance, heat intolerance, polydipsia and polyuria.   Genitourinary:  Negative for dysuria, flank pain and hematuria.   Musculoskeletal:  Negative for arthralgias, back pain, joint swelling, myalgias and neck pain.   Skin:  Negative for pallor, rash and wound.   Allergic/Immunologic: Negative for immunocompromised state.   Neurological:  Negative for dizziness, seizures and headaches.   Hematological:  Does not bruise/bleed easily.   Psychiatric/Behavioral:  Negative for confusion and sleep disturbance.        Objective   Physical Exam  Vitals and nursing note reviewed.   Constitutional:       General: She is not in acute distress.     Appearance: Normal appearance.   HENT:      Head: Normocephalic.      Nose: Nose normal.   Eyes:      Conjunctiva/sclera: Conjunctivae normal.   Neck:      Vascular: No carotid bruit.   Cardiovascular:      Rate and Rhythm: Normal rate and regular rhythm.      Pulses: Normal pulses.      Heart sounds: Normal heart sounds.    Pulmonary:      Effort: Pulmonary effort is normal.      Breath sounds: Normal breath sounds.   Abdominal:      General: Bowel sounds are normal.      Palpations: Abdomen is soft.   Musculoskeletal:         General: Normal range of motion.      Cervical back: Normal range of motion.   Skin:     General: Skin is warm and dry.   Neurological:      Mental Status: She is alert and oriented to person, place, and time. Mental status is at baseline.   Psychiatric:         Mood and Affect: Mood normal.         Behavior: Behavior normal.         Assessment/Plan        Reviewed most recent records available.       Hypertension, PVC: Blood pressure controlled at OV today. Continue Lisinopril and Atenolol.   Hyperlipidemia: Continue Pravastatin. CT Cardiac Scoring: October 2023, moderate risk.   Vitamin D Deficiency: Continue Vitamin D. Reevaluate with next lab work.   GERD: Continue Prilosec.   Prediabetes: A1C 6.1%. Lifestyle changes as noted below.   Obesity: BMI: 37.28. Lifestyle changes recommended: Diet consisting of low fat foods, lean meats, high fiber, fresh fruits and vegetables. 150 min/ weekly aerobic exercise.   Malignant neoplasm of Kidney: Following with Nephrology. Osteopenia: Continue Fosamax as prescribed.     Bone Density: December 2022. Ordered for 2024.    Mammogram: December 2023. Ordered for 2024.   Colonoscopy: March 2015. Plan to repeat in 10 years.    Tdap: July 2021.   Flu Vaccine: October 2023.   COVID Vaccine: October 2023.   Shingrix: June 2023. January 2024.   RSV Vaccine: January 2024.   Prevnar 20: January 2024.   Medicare Wellness: January 2024.     LUKE Mcdonald-CNS 07/29/24 9:29 AM

## 2024-09-15 DIAGNOSIS — E78.5 HYPERLIPIDEMIA, UNSPECIFIED HYPERLIPIDEMIA TYPE: ICD-10-CM

## 2024-09-16 DIAGNOSIS — I49.3 VENTRICULAR PREMATURE BEATS: ICD-10-CM

## 2024-09-16 DIAGNOSIS — E78.5 HYPERLIPIDEMIA, UNSPECIFIED HYPERLIPIDEMIA TYPE: ICD-10-CM

## 2024-09-16 DIAGNOSIS — K21.9 GASTROESOPHAGEAL REFLUX DISEASE WITHOUT ESOPHAGITIS: ICD-10-CM

## 2024-09-16 RX ORDER — OMEPRAZOLE 20 MG/1
20 CAPSULE, DELAYED RELEASE ORAL DAILY
Qty: 90 CAPSULE | Refills: 1 | Status: SHIPPED | OUTPATIENT
Start: 2024-09-16

## 2024-09-16 RX ORDER — OMEGA-3-ACID ETHYL ESTERS 1 G/1
2 CAPSULE, LIQUID FILLED ORAL 2 TIMES DAILY
Qty: 90 CAPSULE | Refills: 3 | Status: SHIPPED | OUTPATIENT
Start: 2024-09-16

## 2024-09-16 RX ORDER — ATENOLOL 25 MG/1
25 TABLET ORAL DAILY
Qty: 90 TABLET | Refills: 1 | Status: SHIPPED | OUTPATIENT
Start: 2024-09-16

## 2024-09-16 RX ORDER — PRAVASTATIN SODIUM 40 MG/1
40 TABLET ORAL DAILY
Qty: 90 TABLET | Refills: 1 | Status: SHIPPED | OUTPATIENT
Start: 2024-09-16

## 2024-10-10 DIAGNOSIS — E78.5 HYPERLIPIDEMIA, UNSPECIFIED HYPERLIPIDEMIA TYPE: ICD-10-CM

## 2024-10-10 RX ORDER — OMEGA-3-ACID ETHYL ESTERS 1 G/1
2 CAPSULE, LIQUID FILLED ORAL 2 TIMES DAILY
Qty: 90 CAPSULE | Refills: 1 | Status: SHIPPED | OUTPATIENT
Start: 2024-10-10

## 2024-11-15 ENCOUNTER — TELEPHONE (OUTPATIENT)
Dept: PRIMARY CARE | Facility: CLINIC | Age: 64
End: 2024-11-15
Payer: MEDICARE

## 2024-11-15 NOTE — TELEPHONE ENCOUNTER
[facility] called they needs a office note or something that states about a slept pap please advice and fax it to them at [phone]

## 2024-11-18 NOTE — TELEPHONE ENCOUNTER
I'm not sure that we have discussed this at visit for documentation. Might need an OV to discuss. Thank you!

## 2024-11-25 ENCOUNTER — APPOINTMENT (OUTPATIENT)
Dept: PRIMARY CARE | Facility: CLINIC | Age: 64
End: 2024-11-25
Payer: MEDICARE

## 2024-11-25 VITALS
SYSTOLIC BLOOD PRESSURE: 110 MMHG | HEIGHT: 66 IN | HEART RATE: 63 BPM | DIASTOLIC BLOOD PRESSURE: 80 MMHG | BODY MASS INDEX: 34.87 KG/M2 | WEIGHT: 217 LBS

## 2024-11-25 DIAGNOSIS — Z90.5 SOLITARY KIDNEY, ACQUIRED: ICD-10-CM

## 2024-11-25 DIAGNOSIS — I10 ESSENTIAL HYPERTENSION: ICD-10-CM

## 2024-11-25 DIAGNOSIS — G47.33 OBSTRUCTIVE SLEEP APNEA: Primary | ICD-10-CM

## 2024-11-25 DIAGNOSIS — E78.2 MIXED HYPERLIPIDEMIA: ICD-10-CM

## 2024-11-25 PROCEDURE — 1036F TOBACCO NON-USER: CPT | Performed by: CLINICAL NURSE SPECIALIST

## 2024-11-25 PROCEDURE — 1159F MED LIST DOCD IN RCRD: CPT | Performed by: CLINICAL NURSE SPECIALIST

## 2024-11-25 PROCEDURE — 3074F SYST BP LT 130 MM HG: CPT | Performed by: CLINICAL NURSE SPECIALIST

## 2024-11-25 PROCEDURE — 3079F DIAST BP 80-89 MM HG: CPT | Performed by: CLINICAL NURSE SPECIALIST

## 2024-11-25 PROCEDURE — 99214 OFFICE O/P EST MOD 30 MIN: CPT | Performed by: CLINICAL NURSE SPECIALIST

## 2024-11-25 PROCEDURE — 3008F BODY MASS INDEX DOCD: CPT | Performed by: CLINICAL NURSE SPECIALIST

## 2024-11-25 PROCEDURE — 1160F RVW MEDS BY RX/DR IN RCRD: CPT | Performed by: CLINICAL NURSE SPECIALIST

## 2024-11-25 PROCEDURE — 1123F ACP DISCUSS/DSCN MKR DOCD: CPT | Performed by: CLINICAL NURSE SPECIALIST

## 2024-11-25 ASSESSMENT — PATIENT HEALTH QUESTIONNAIRE - PHQ9
SUM OF ALL RESPONSES TO PHQ9 QUESTIONS 1 AND 2: 0
2. FEELING DOWN, DEPRESSED OR HOPELESS: NOT AT ALL
1. LITTLE INTEREST OR PLEASURE IN DOING THINGS: NOT AT ALL

## 2024-11-25 ASSESSMENT — ENCOUNTER SYMPTOMS
UNEXPECTED WEIGHT CHANGE: 0
ARTHRALGIAS: 0
SHORTNESS OF BREATH: 0
PALPITATIONS: 0
NECK PAIN: 0
CONSTIPATION: 0
APPETITE CHANGE: 0
VOMITING: 0
JOINT SWELLING: 0
FATIGUE: 0
DYSURIA: 0
LOSS OF SENSATION IN FEET: 0
MYALGIAS: 0
TROUBLE SWALLOWING: 0
ABDOMINAL PAIN: 0
FEVER: 0
DIZZINESS: 0
EYE PAIN: 0
CHILLS: 0
NAUSEA: 0
BACK PAIN: 0
ACTIVITY CHANGE: 0
HEADACHES: 0
FLANK PAIN: 0
SORE THROAT: 0
SLEEP DISTURBANCE: 0
OCCASIONAL FEELINGS OF UNSTEADINESS: 0
CONFUSION: 0
POLYDIPSIA: 0
COUGH: 0
BLOOD IN STOOL: 0
WHEEZING: 0
WOUND: 0
PHOTOPHOBIA: 0
DIARRHEA: 0
HEMATURIA: 0
DEPRESSION: 0
BRUISES/BLEEDS EASILY: 0
CHEST TIGHTNESS: 0
SEIZURES: 0

## 2024-11-25 NOTE — PROGRESS NOTES
Subjective   Patient ID: Ana Bustillos is a 63 y.o. female who presents for Follow-up (Follow up CPAP compliance ).  HPI    Here today as a follow up appointment. Discuss CPAP supplies. Needs new equipment, last ordered 1 year ago.      Follows with [facility] for Nephrology. Nephrectomy. Most recent visit was told that she can be seen annually, seen November 2024.      Overall feels well. Tolerating current medications.      Has been continuing to work on weight loss since last OV. Feels well. Has lost about 17 pounds since last visit.     Using PAP therapy. Has been beneficial on current therapy settings. Feels well rested. [facility].     Review of Systems   Constitutional:  Negative for activity change, appetite change, chills, fatigue, fever and unexpected weight change.   HENT:  Negative for ear pain, hearing loss, nosebleeds, sore throat, tinnitus and trouble swallowing.    Eyes:  Negative for photophobia, pain and visual disturbance.   Respiratory:  Negative for cough, chest tightness, shortness of breath and wheezing.    Cardiovascular:  Negative for chest pain, palpitations and leg swelling.   Gastrointestinal:  Negative for abdominal pain, blood in stool, constipation, diarrhea, nausea and vomiting.   Endocrine: Negative for cold intolerance, heat intolerance, polydipsia and polyuria.   Genitourinary:  Negative for dysuria, flank pain and hematuria.   Musculoskeletal:  Negative for arthralgias, back pain, joint swelling, myalgias and neck pain.   Skin:  Negative for pallor, rash and wound.   Allergic/Immunologic: Negative for immunocompromised state.   Neurological:  Negative for dizziness, seizures and headaches.   Hematological:  Does not bruise/bleed easily.   Psychiatric/Behavioral:  Negative for confusion and sleep disturbance.        Objective   Physical Exam  Vitals and nursing note reviewed.   Constitutional:       General: She is not in acute distress.     Appearance: Normal appearance.    HENT:      Head: Normocephalic.      Nose: Nose normal.   Eyes:      Conjunctiva/sclera: Conjunctivae normal.   Neck:      Vascular: No carotid bruit.   Cardiovascular:      Rate and Rhythm: Normal rate and regular rhythm.      Pulses: Normal pulses.      Heart sounds: Normal heart sounds.   Pulmonary:      Effort: Pulmonary effort is normal.      Breath sounds: Normal breath sounds.   Abdominal:      General: Bowel sounds are normal.      Palpations: Abdomen is soft.   Musculoskeletal:         General: Normal range of motion.      Cervical back: Normal range of motion.   Skin:     General: Skin is warm and dry.   Neurological:      Mental Status: She is alert and oriented to person, place, and time. Mental status is at baseline.   Psychiatric:         Mood and Affect: Mood normal.         Behavior: Behavior normal.         Assessment/Plan        Reviewed most recent records available.       Hypertension, PVC: Blood pressure controlled at OV today. Continue Lisinopril and Atenolol.   Hyperlipidemia: Continue Pravastatin. CT Cardiac Scoring: October 2023, moderate risk.   Vitamin D Deficiency: Continue Vitamin D. Reevaluate with next lab work.   GERD: Continue Prilosec.   Prediabetes: A1C 6.1%. Lifestyle changes as noted below.   Obesity: BMI: 35.02. Lifestyle changes recommended: Diet consisting of low fat foods, lean meats, high fiber, fresh fruits and vegetables. 150 min/ weekly aerobic exercise.   Malignant neoplasm of Kidney: Following with Nephrology.   Osteopenia: Continue Fosamax as prescribed.  Sleep Apnea: Continue CPAP and supplies as needed as beneficial therapy. Therapy supplies ordered.       Bone Density: December 2022. Ordered for 2024.    Mammogram: December 2023. Ordered for 2024.   Colonoscopy: March 2015. Plan to repeat in 10 years.    Tdap: July 2021.   Flu Vaccine: September 2024.   COVID Vaccine: September 2024.    Shingrix: June 2023. January 2024.   RSV Vaccine: January 2024.   Prevnar 20:  January 2024.   Medicare Wellness: January 2024.     Maddi Ramírez, APRN-CNS 11/25/24 7:26 AM

## 2024-12-09 ENCOUNTER — HOSPITAL ENCOUNTER (OUTPATIENT)
Dept: RADIOLOGY | Facility: CLINIC | Age: 64
Discharge: HOME | End: 2024-12-09
Payer: MEDICARE

## 2024-12-09 DIAGNOSIS — Z12.31 VISIT FOR SCREENING MAMMOGRAM: ICD-10-CM

## 2024-12-09 DIAGNOSIS — Z78.0 POST-MENOPAUSAL: ICD-10-CM

## 2024-12-09 PROCEDURE — 77080 DXA BONE DENSITY AXIAL: CPT

## 2024-12-09 PROCEDURE — 77063 BREAST TOMOSYNTHESIS BI: CPT

## 2024-12-09 PROCEDURE — 77080 DXA BONE DENSITY AXIAL: CPT | Performed by: RADIOLOGY

## 2024-12-09 PROCEDURE — 77063 BREAST TOMOSYNTHESIS BI: CPT | Performed by: RADIOLOGY

## 2024-12-09 PROCEDURE — 77067 SCR MAMMO BI INCL CAD: CPT | Performed by: RADIOLOGY

## 2024-12-10 ENCOUNTER — HOSPITAL ENCOUNTER (OUTPATIENT)
Dept: RADIOLOGY | Facility: HOSPITAL | Age: 64
Discharge: HOME | End: 2024-12-10
Payer: MEDICARE

## 2024-12-10 ENCOUNTER — TELEPHONE (OUTPATIENT)
Dept: PRIMARY CARE | Facility: CLINIC | Age: 64
End: 2024-12-10
Payer: MEDICARE

## 2024-12-10 DIAGNOSIS — R92.8 ABNORMAL MAMMOGRAM: ICD-10-CM

## 2024-12-10 PROCEDURE — G0279 TOMOSYNTHESIS, MAMMO: HCPCS | Mod: RIGHT SIDE | Performed by: RADIOLOGY

## 2024-12-10 PROCEDURE — 77065 DX MAMMO INCL CAD UNI: CPT | Mod: RIGHT SIDE | Performed by: RADIOLOGY

## 2024-12-10 PROCEDURE — 77065 DX MAMMO INCL CAD UNI: CPT | Mod: RT

## 2024-12-10 NOTE — TELEPHONE ENCOUNTER
----- Message from Maddi Ramírez sent at 12/9/2024  7:11 PM EST -----  Updated patient via Portal. Will need Right breast diagnostic mammogram, please order. Thank you!

## 2024-12-22 DIAGNOSIS — E78.5 HYPERLIPIDEMIA, UNSPECIFIED HYPERLIPIDEMIA TYPE: ICD-10-CM

## 2024-12-22 RX ORDER — OMEGA-3-ACID ETHYL ESTERS 1 G/1
2 CAPSULE, LIQUID FILLED ORAL 2 TIMES DAILY
Qty: 90 CAPSULE | Refills: 1 | Status: SHIPPED | OUTPATIENT
Start: 2024-12-22

## 2025-01-03 DIAGNOSIS — I10 BENIGN HYPERTENSION: ICD-10-CM

## 2025-01-03 RX ORDER — LISINOPRIL 20 MG/1
20 TABLET ORAL DAILY
Qty: 90 TABLET | Refills: 1 | Status: SHIPPED | OUTPATIENT
Start: 2025-01-03

## 2025-01-10 ENCOUNTER — LAB (OUTPATIENT)
Dept: LAB | Facility: LAB | Age: 65
End: 2025-01-10
Payer: MEDICARE

## 2025-01-10 DIAGNOSIS — Z78.0 POST-MENOPAUSAL: ICD-10-CM

## 2025-01-10 DIAGNOSIS — I10 ESSENTIAL HYPERTENSION: ICD-10-CM

## 2025-01-10 DIAGNOSIS — I10 BENIGN HYPERTENSION: ICD-10-CM

## 2025-01-10 DIAGNOSIS — Z00.00 WELCOME TO MEDICARE PREVENTIVE VISIT: ICD-10-CM

## 2025-01-10 DIAGNOSIS — Z12.31 VISIT FOR SCREENING MAMMOGRAM: ICD-10-CM

## 2025-01-10 DIAGNOSIS — M85.80 OSTEOPENIA, UNSPECIFIED LOCATION: ICD-10-CM

## 2025-01-10 DIAGNOSIS — I49.3 VENTRICULAR PREMATURE BEATS: ICD-10-CM

## 2025-01-10 DIAGNOSIS — E78.5 HYPERLIPIDEMIA, UNSPECIFIED HYPERLIPIDEMIA TYPE: ICD-10-CM

## 2025-01-10 DIAGNOSIS — K21.9 GASTROESOPHAGEAL REFLUX DISEASE WITHOUT ESOPHAGITIS: ICD-10-CM

## 2025-01-10 DIAGNOSIS — E78.2 MIXED HYPERLIPIDEMIA: ICD-10-CM

## 2025-01-10 DIAGNOSIS — E55.9 VITAMIN D DEFICIENCY: ICD-10-CM

## 2025-01-10 DIAGNOSIS — R73.02 IMPAIRED GLUCOSE TOLERANCE: ICD-10-CM

## 2025-01-10 LAB
25(OH)D3 SERPL-MCNC: 50 NG/ML (ref 30–100)
ALBUMIN SERPL BCP-MCNC: 4.4 G/DL (ref 3.4–5)
ALP SERPL-CCNC: 33 U/L (ref 33–136)
ALT SERPL W P-5'-P-CCNC: 12 U/L (ref 7–45)
ANION GAP SERPL CALC-SCNC: 12 MMOL/L (ref 10–20)
AST SERPL W P-5'-P-CCNC: 13 U/L (ref 9–39)
BILIRUB SERPL-MCNC: 0.9 MG/DL (ref 0–1.2)
BUN SERPL-MCNC: 28 MG/DL (ref 6–23)
CALCIUM SERPL-MCNC: 9.8 MG/DL (ref 8.6–10.3)
CHLORIDE SERPL-SCNC: 103 MMOL/L (ref 98–107)
CHOLEST SERPL-MCNC: 166 MG/DL (ref 0–199)
CHOLESTEROL/HDL RATIO: 3.6
CO2 SERPL-SCNC: 24 MMOL/L (ref 21–32)
CREAT SERPL-MCNC: 0.92 MG/DL (ref 0.5–1.05)
EGFRCR SERPLBLD CKD-EPI 2021: 69 ML/MIN/1.73M*2
ERYTHROCYTE [DISTWIDTH] IN BLOOD BY AUTOMATED COUNT: 15.1 % (ref 11.5–14.5)
GLUCOSE SERPL-MCNC: 93 MG/DL (ref 74–99)
HCT VFR BLD AUTO: 40.7 % (ref 36–46)
HCV AB SER QL: NONREACTIVE
HDLC SERPL-MCNC: 45.5 MG/DL
HGB BLD-MCNC: 13.2 G/DL (ref 12–16)
LDLC SERPL CALC-MCNC: 100 MG/DL
MCH RBC QN AUTO: 29.7 PG (ref 26–34)
MCHC RBC AUTO-ENTMCNC: 32.4 G/DL (ref 32–36)
MCV RBC AUTO: 92 FL (ref 80–100)
NON HDL CHOLESTEROL: 121 MG/DL (ref 0–149)
NRBC BLD-RTO: 0 /100 WBCS (ref 0–0)
PLATELET # BLD AUTO: 250 X10*3/UL (ref 150–450)
POTASSIUM SERPL-SCNC: 4.3 MMOL/L (ref 3.5–5.3)
PROT SERPL-MCNC: 6.6 G/DL (ref 6.4–8.2)
RBC # BLD AUTO: 4.44 X10*6/UL (ref 4–5.2)
SODIUM SERPL-SCNC: 135 MMOL/L (ref 136–145)
TRIGL SERPL-MCNC: 101 MG/DL (ref 0–149)
TSH SERPL-ACNC: 0.86 MIU/L (ref 0.44–3.98)
VIT B12 SERPL-MCNC: 2459 PG/ML (ref 211–911)
VLDL: 20 MG/DL (ref 0–40)
WBC # BLD AUTO: 7.8 X10*3/UL (ref 4.4–11.3)

## 2025-01-10 PROCEDURE — 83036 HEMOGLOBIN GLYCOSYLATED A1C: CPT

## 2025-01-10 PROCEDURE — 86803 HEPATITIS C AB TEST: CPT

## 2025-01-11 LAB
EST. AVERAGE GLUCOSE BLD GHB EST-MCNC: 117 MG/DL
HBA1C MFR BLD: 5.7 %

## 2025-01-31 ENCOUNTER — APPOINTMENT (OUTPATIENT)
Dept: PRIMARY CARE | Facility: CLINIC | Age: 65
End: 2025-01-31
Payer: MEDICARE

## 2025-01-31 VITALS
OXYGEN SATURATION: 96 % | SYSTOLIC BLOOD PRESSURE: 113 MMHG | HEIGHT: 66 IN | HEART RATE: 61 BPM | DIASTOLIC BLOOD PRESSURE: 75 MMHG | WEIGHT: 214.2 LBS | BODY MASS INDEX: 34.42 KG/M2

## 2025-01-31 DIAGNOSIS — Z78.0 POST-MENOPAUSAL: ICD-10-CM

## 2025-01-31 DIAGNOSIS — I49.3 VENTRICULAR PREMATURE BEATS: ICD-10-CM

## 2025-01-31 DIAGNOSIS — E78.5 HYPERLIPIDEMIA, UNSPECIFIED HYPERLIPIDEMIA TYPE: ICD-10-CM

## 2025-01-31 DIAGNOSIS — E55.9 VITAMIN D DEFICIENCY: ICD-10-CM

## 2025-01-31 DIAGNOSIS — I10 BENIGN HYPERTENSION: ICD-10-CM

## 2025-01-31 DIAGNOSIS — Z12.31 VISIT FOR SCREENING MAMMOGRAM: ICD-10-CM

## 2025-01-31 DIAGNOSIS — K21.9 GASTROESOPHAGEAL REFLUX DISEASE WITHOUT ESOPHAGITIS: ICD-10-CM

## 2025-01-31 DIAGNOSIS — Z12.11 COLON CANCER SCREENING: ICD-10-CM

## 2025-01-31 DIAGNOSIS — Z00.00 MEDICARE ANNUAL WELLNESS VISIT, SUBSEQUENT: Primary | ICD-10-CM

## 2025-01-31 DIAGNOSIS — M85.80 OSTEOPENIA, UNSPECIFIED LOCATION: ICD-10-CM

## 2025-01-31 DIAGNOSIS — R73.02 IMPAIRED GLUCOSE TOLERANCE: ICD-10-CM

## 2025-01-31 DIAGNOSIS — E78.2 MIXED HYPERLIPIDEMIA: ICD-10-CM

## 2025-01-31 DIAGNOSIS — I10 ESSENTIAL HYPERTENSION: ICD-10-CM

## 2025-01-31 PROCEDURE — 3078F DIAST BP <80 MM HG: CPT | Performed by: CLINICAL NURSE SPECIALIST

## 2025-01-31 PROCEDURE — 3074F SYST BP LT 130 MM HG: CPT | Performed by: CLINICAL NURSE SPECIALIST

## 2025-01-31 PROCEDURE — 1124F ACP DISCUSS-NO DSCNMKR DOCD: CPT | Performed by: CLINICAL NURSE SPECIALIST

## 2025-01-31 PROCEDURE — 99214 OFFICE O/P EST MOD 30 MIN: CPT | Performed by: CLINICAL NURSE SPECIALIST

## 2025-01-31 PROCEDURE — 1170F FXNL STATUS ASSESSED: CPT | Performed by: CLINICAL NURSE SPECIALIST

## 2025-01-31 PROCEDURE — 3008F BODY MASS INDEX DOCD: CPT | Performed by: CLINICAL NURSE SPECIALIST

## 2025-01-31 PROCEDURE — 1036F TOBACCO NON-USER: CPT | Performed by: CLINICAL NURSE SPECIALIST

## 2025-01-31 PROCEDURE — 1159F MED LIST DOCD IN RCRD: CPT | Performed by: CLINICAL NURSE SPECIALIST

## 2025-01-31 PROCEDURE — 1160F RVW MEDS BY RX/DR IN RCRD: CPT | Performed by: CLINICAL NURSE SPECIALIST

## 2025-01-31 PROCEDURE — G0444 DEPRESSION SCREEN ANNUAL: HCPCS | Performed by: CLINICAL NURSE SPECIALIST

## 2025-01-31 PROCEDURE — G0439 PPPS, SUBSEQ VISIT: HCPCS | Performed by: CLINICAL NURSE SPECIALIST

## 2025-01-31 RX ORDER — FAMOTIDINE 20 MG/1
20 TABLET, FILM COATED ORAL
COMMUNITY
Start: 2024-11-08 | End: 2025-08-05

## 2025-01-31 ASSESSMENT — ENCOUNTER SYMPTOMS
CHILLS: 0
CONFUSION: 0
VOMITING: 0
FEVER: 0
ACTIVITY CHANGE: 0
PALPITATIONS: 0
WHEEZING: 0
OCCASIONAL FEELINGS OF UNSTEADINESS: 0
SORE THROAT: 0
DIARRHEA: 0
HEADACHES: 0
APPETITE CHANGE: 0
PHOTOPHOBIA: 0
UNEXPECTED WEIGHT CHANGE: 0
FATIGUE: 0
SHORTNESS OF BREATH: 0
MYALGIAS: 0
CONSTIPATION: 0
LOSS OF SENSATION IN FEET: 0
COUGH: 0
SEIZURES: 0
POLYDIPSIA: 0
WOUND: 0
SLEEP DISTURBANCE: 0
DIZZINESS: 0
BLOOD IN STOOL: 0
JOINT SWELLING: 0
ARTHRALGIAS: 0
EYE PAIN: 0
NAUSEA: 0
BRUISES/BLEEDS EASILY: 0
NECK PAIN: 0
BACK PAIN: 0
TROUBLE SWALLOWING: 0
CHEST TIGHTNESS: 0
DYSURIA: 0
HEMATURIA: 0
ABDOMINAL PAIN: 0
FLANK PAIN: 0

## 2025-01-31 ASSESSMENT — ACTIVITIES OF DAILY LIVING (ADL)
GROCERY_SHOPPING: INDEPENDENT
MANAGING_FINANCES: INDEPENDENT
BATHING: INDEPENDENT
TAKING_MEDICATION: INDEPENDENT
DRESSING: INDEPENDENT
DOING_HOUSEWORK: INDEPENDENT

## 2025-01-31 NOTE — ASSESSMENT & PLAN NOTE
Orders:    Follow Up In [facility] - PCP - Medicare Annual    Follow Up In [facility] - PCP - [facility]; Future    CBC; Future    Comprehensive Metabolic Panel; Future    Lipid Panel; Future    TSH with reflex to Free T4 if abnormal; Future    Vitamin B12; Future    Hemoglobin A1C; Future    Vitamin D 25-Hydroxy,Total (for eval of Vitamin D levels); Future

## 2025-01-31 NOTE — ASSESSMENT & PLAN NOTE
Orders:    Follow Up In [facility] - PCP - Medicare Annual    Follow Up In [facility] - PCP - [facility]; Future    CBC; Future    Comprehensive Metabolic Panel; Future    Lipid Panel; Future    TSH with reflex to Free T4 if abnormal; Future    Vitamin B12; Future    Hemoglobin A1C; Future    Vitamin D 25-Hydroxy,Total (for eval of Vitamin D levels); Future

## 2025-01-31 NOTE — PROGRESS NOTES
Subjective Subjective   Reason for Visit: Ana Bustillos is an 64 y.o. female here for a Medicare Wellness visit.     Past Medical, Surgical, and Family History reviewed and updated in chart.    Reviewed all medications by prescribing practitioner or clinical pharmacist (such as prescriptions, OTCs, herbal therapies and supplements) and documented in the medical record.    HPI    Here today as a follow up appointment. Due for Medicare Wellness.      Follows with [facility] for Nephrology. Nephrectomy. Most recent visit was told that she can be seen annually, seen November 2024.      Overall feels well. Tolerating current medications.      Has been continuing to work on weight loss since last OV. Feels well.      Using PAP therapy. Has been beneficial on current therapy settings. Feels well rested. [facility].     Patient Care Team:  ALDO Mcdonald as PCP - General (Family Medicine)  JOANNE Rebolledo as Nurse Practitioner (Family Medicine)  Maxwell Figueroa MD as Referring Physician (Nephrology)     Review of Systems   Constitutional:  Negative for activity change, appetite change, chills, fatigue, fever and unexpected weight change.   HENT:  Negative for ear pain, hearing loss, nosebleeds, sore throat, tinnitus and trouble swallowing.    Eyes:  Negative for photophobia, pain and visual disturbance.   Respiratory:  Negative for cough, chest tightness, shortness of breath and wheezing.    Cardiovascular:  Negative for chest pain, palpitations and leg swelling.   Gastrointestinal:  Negative for abdominal pain, blood in stool, constipation, diarrhea, nausea and vomiting.   Endocrine: Negative for cold intolerance, heat intolerance, polydipsia and polyuria.   Genitourinary:  Negative for dysuria, flank pain and hematuria.   Musculoskeletal:  Negative for arthralgias, back pain, joint swelling, myalgias and neck pain.   Skin:  Negative for pallor, rash and wound.  "  Allergic/Immunologic: Negative for immunocompromised state.   Neurological:  Negative for dizziness, seizures and headaches.   Hematological:  Does not bruise/bleed easily.   Psychiatric/Behavioral:  Negative for confusion and sleep disturbance.        Objective   Vitals:  /75   Pulse 61   Ht 1.676 m (5' 6\")   Wt 97.2 kg (214 lb 3.2 oz)   SpO2 96%   BMI 34.57 kg/m²       Physical Exam  Vitals and nursing note reviewed.   Constitutional:       General: She is not in acute distress.     Appearance: Normal appearance.   HENT:      Head: Normocephalic.      Nose: Nose normal.   Eyes:      Conjunctiva/sclera: Conjunctivae normal.   Neck:      Vascular: No carotid bruit.   Cardiovascular:      Rate and Rhythm: Normal rate and regular rhythm.      Pulses: Normal pulses.      Heart sounds: Normal heart sounds.   Pulmonary:      Effort: Pulmonary effort is normal.      Breath sounds: Normal breath sounds.   Abdominal:      General: Bowel sounds are normal.      Palpations: Abdomen is soft.   Musculoskeletal:         General: Normal range of motion.      Cervical back: Normal range of motion.   Skin:     General: Skin is warm and dry.   Neurological:      Mental Status: She is alert and oriented to person, place, and time. Mental status is at baseline.   Psychiatric:         Mood and Affect: Mood normal.         Behavior: Behavior normal.       Assessment & Plan  Visit for screening mammogram    Orders:  •  Follow Up In [facility] - [facility] - Medicare Annual  •  Follow Up In [facility] - [facility]; Future  •  CBC; Future  •  Comprehensive Metabolic Panel; Future  •  Lipid Panel; Future  •  TSH with reflex to Free T4 if abnormal; Future  •  Vitamin B12; Future  •  Hemoglobin A1C; Future  •  Vitamin D 25-Hydroxy,Total (for eval of Vitamin D levels); Future    Mixed hyperlipidemia    Orders:  •  Follow Up In [facility] - PCP - Medicare Annual  •  Follow Up In [facility] " Care - PCP - Established; Future  •  CBC; Future  •  Comprehensive Metabolic Panel; Future  •  Lipid Panel; Future  •  TSH with reflex to Free T4 if abnormal; Future  •  Vitamin B12; Future  •  Hemoglobin A1C; Future  •  Vitamin D 25-Hydroxy,Total (for eval of Vitamin D levels); Future    Essential hypertension    Orders:  •  Follow Up In [facility] - PCP - Medicare Annual  •  Follow Up In [facility]; Future  •  CBC; Future  •  Comprehensive Metabolic Panel; Future  •  Lipid Panel; Future  •  TSH with reflex to Free T4 if abnormal; Future  •  Vitamin B12; Future  •  Hemoglobin A1C; Future  •  Vitamin D 25-Hydroxy,Total (for eval of Vitamin D levels); Future    Vitamin D deficiency    Orders:  •  Follow Up In [facility] - PCP - Medicare Annual  •  Follow Up In [facility]; Future  •  CBC; Future  •  Comprehensive Metabolic Panel; Future  •  Lipid Panel; Future  •  TSH with reflex to Free T4 if abnormal; Future  •  Vitamin B12; Future  •  Hemoglobin A1C; Future  •  Vitamin D 25-Hydroxy,Total (for eval of Vitamin D levels); Future    Osteopenia, unspecified location    Orders:  •  Follow Up In [facility] - PCP - Medicare Annual  •  Follow Up In [facility]; Future  •  CBC; Future  •  Comprehensive Metabolic Panel; Future  •  Lipid Panel; Future  •  TSH with reflex to Free T4 if abnormal; Future  •  Vitamin B12; Future  •  Hemoglobin A1C; Future  •  Vitamin D 25-Hydroxy,Total (for eval of Vitamin D levels); Future    Ventricular premature beats    Orders:  •  Follow Up In [facility] - PCP - Medicare Annual  •  Follow Up In [facility]; Future  •  CBC; Future  •  Comprehensive Metabolic Panel; Future  •  Lipid Panel; Future  •  TSH with reflex to Free T4 if abnormal; Future  •  Vitamin B12; Future  •  Hemoglobin A1C; Future  •  Vitamin D 25-Hydroxy,Total (for  eval of Vitamin D levels); Future    Benign hypertension    Orders:  •  Follow Up In [facility] - PCP - Medicare Annual  •  Follow Up In [facility]; Future  •  CBC; Future  •  Comprehensive Metabolic Panel; Future  •  Lipid Panel; Future  •  TSH with reflex to Free T4 if abnormal; Future  •  Vitamin B12; Future  •  Hemoglobin A1C; Future  •  Vitamin D 25-Hydroxy,Total (for eval of Vitamin D levels); Future    Gastroesophageal reflux disease without esophagitis    Orders:  •  Follow Up In [facility] - PCP - Medicare Annual  •  Follow Up In [facility]; Future  •  CBC; Future  •  Comprehensive Metabolic Panel; Future  •  Lipid Panel; Future  •  TSH with reflex to Free T4 if abnormal; Future  •  Vitamin B12; Future  •  Hemoglobin A1C; Future  •  Vitamin D 25-Hydroxy,Total (for eval of Vitamin D levels); Future    Hyperlipidemia, unspecified hyperlipidemia type    Orders:  •  Follow Up In [facility] - PCP - Medicare Annual  •  Follow Up In [facility]; Future  •  CBC; Future  •  Comprehensive Metabolic Panel; Future  •  Lipid Panel; Future  •  TSH with reflex to Free T4 if abnormal; Future  •  Vitamin B12; Future  •  Hemoglobin A1C; Future  •  Vitamin D 25-Hydroxy,Total (for eval of Vitamin D levels); Future    Post-menopausal    Orders:  •  Follow Up In [facility] - PCP - Medicare Annual  •  Follow Up In [facility]; Future  •  CBC; Future  •  Comprehensive Metabolic Panel; Future  •  Lipid Panel; Future  •  TSH with reflex to Free T4 if abnormal; Future  •  Vitamin B12; Future  •  Hemoglobin A1C; Future  •  Vitamin D 25-Hydroxy,Total (for eval of Vitamin D levels); Future    Impaired glucose tolerance    Orders:  •  Follow Up In [facility] - PCP - Medicare Annual  •  Follow Up In [facility]; Future  •  CBC;  Future  •  Comprehensive Metabolic Panel; Future  •  Lipid Panel; Future  •  TSH with reflex to Free T4 if abnormal; Future  •  Vitamin B12; Future  •  Hemoglobin A1C; Future  •  Vitamin D 25-Hydroxy,Total (for eval of Vitamin D levels); Future    Medicare annual wellness visit, subsequent    Orders:  •  Follow Up In [facility] - PCP - Established; Future  •  CBC; Future  •  Comprehensive Metabolic Panel; Future  •  Lipid Panel; Future  •  TSH with reflex to Free T4 if abnormal; Future  •  Vitamin B12; Future  •  Hemoglobin A1C; Future  •  Vitamin D 25-Hydroxy,Total (for eval of Vitamin D levels); Future    Colon cancer screening    Orders:  •  Colonoscopy Screening; Average Risk Patient; Future             Reviewed most recent records available.       Hypertension, PVC: Blood pressure controlled at OV today. Continue Lisinopril and Atenolol.   Hyperlipidemia: Continue Pravastatin. CT Cardiac Scoring: October 2023, moderate risk.   Vitamin D Deficiency: Continue Vitamin D. Reevaluate with next lab work.   GERD: Continue Prilosec PRN and Famotidine.   Prediabetes: A1C 5.7%. Lifestyle changes as noted below.   Obesity: BMI: 34.57 Lifestyle changes recommended: Diet consisting of low fat foods, lean meats, high fiber, fresh fruits and vegetables. 150 min/ weekly aerobic exercise.   Malignant neoplasm of Kidney: Following with Nephrology.   Osteopenia: Continue Fosamax as prescribed.  Sleep Apnea: Continue CPAP and supplies as needed as beneficial therapy.   Medicare Wellness: Routine and age appropriate recommendations discussed with the patient today and patient verbalized understanding of the recommendations.  Questions answered.  Age appropriate immunizations and preventative screenings discussed with the patient and ordered as appropriate. Labs updated and ordered as indicated. Recommend healthy diet and daily exercise to maintain healthy body weight. 5 minutes  were spent screening for depression using  PHQ2/PHQ9 as documented in the chart.       Bone Density: December 2024, Osteopenia.   Mammogram: December 2024.   Colonoscopy: March 2015. Plan to repeat in 10 years.  Ordered for 2025.   Tdap: July 2021.   Flu Vaccine: September 2024.   COVID Vaccine: September 2024.    Shingrix: June 2023. January 2024.   RSV Vaccine: January 2024.   Prevnar 20: January 2024.   Medicare Wellness: January 2025.

## 2025-01-31 NOTE — ASSESSMENT & PLAN NOTE
Orders:    Follow Up In [facility] - PCP - Medicare Annual    Follow Up In [facility] - [facility] - [facility]; Future    CBC; Future    Comprehensive Metabolic Panel; Future    Lipid Panel; Future    TSH with reflex to Free T4 if abnormal; Future    Vitamin B12; Future    Hemoglobin A1C; Future    Vitamin D 25-Hydroxy,Total (for eval of Vitamin D levels); Future

## 2025-01-31 NOTE — ASSESSMENT & PLAN NOTE
Orders:    Follow Up In [facility] - PCP - Established; Future    CBC; Future    Comprehensive Metabolic Panel; Future    Lipid Panel; Future    TSH with reflex to Free T4 if abnormal; Future    Vitamin B12; Future    Hemoglobin A1C; Future    Vitamin D 25-Hydroxy,Total (for eval of Vitamin D levels); Future

## 2025-02-02 DIAGNOSIS — E78.5 HYPERLIPIDEMIA, UNSPECIFIED HYPERLIPIDEMIA TYPE: ICD-10-CM

## 2025-02-02 RX ORDER — OMEGA-3-ACID ETHYL ESTERS 1 G/1
2 CAPSULE, LIQUID FILLED ORAL 2 TIMES DAILY
Qty: 90 CAPSULE | Refills: 1 | Status: SHIPPED | OUTPATIENT
Start: 2025-02-02

## 2025-02-04 ENCOUNTER — TELEPHONE (OUTPATIENT)
Facility: CLINIC | Age: 65
End: 2025-02-04
Payer: MEDICARE

## 2025-02-27 ENCOUNTER — ANESTHESIA EVENT (OUTPATIENT)
Dept: GASTROENTEROLOGY | Facility: HOSPITAL | Age: 65
End: 2025-02-27
Payer: MEDICARE

## 2025-03-03 ENCOUNTER — ANESTHESIA (OUTPATIENT)
Dept: GASTROENTEROLOGY | Facility: HOSPITAL | Age: 65
End: 2025-03-03
Payer: MEDICARE

## 2025-03-03 ENCOUNTER — HOSPITAL ENCOUNTER (OUTPATIENT)
Dept: GASTROENTEROLOGY | Facility: HOSPITAL | Age: 65
Discharge: HOME | End: 2025-03-03
Payer: MEDICARE

## 2025-03-03 VITALS
OXYGEN SATURATION: 93 % | HEIGHT: 66 IN | SYSTOLIC BLOOD PRESSURE: 115 MMHG | RESPIRATION RATE: 18 BRPM | WEIGHT: 214 LBS | DIASTOLIC BLOOD PRESSURE: 77 MMHG | HEART RATE: 68 BPM | TEMPERATURE: 98.1 F | BODY MASS INDEX: 34.39 KG/M2

## 2025-03-03 DIAGNOSIS — Z12.11 SCREEN FOR COLON CANCER: ICD-10-CM

## 2025-03-03 PROCEDURE — 3700000002 HC GENERAL ANESTHESIA TIME - EACH INCREMENTAL 1 MINUTE

## 2025-03-03 PROCEDURE — 7100000009 HC PHASE TWO TIME - INITIAL BASE CHARGE

## 2025-03-03 PROCEDURE — 7100000010 HC PHASE TWO TIME - EACH INCREMENTAL 1 MINUTE

## 2025-03-03 PROCEDURE — G0105 COLORECTAL SCRN; HI RISK IND: HCPCS | Performed by: SURGERY

## 2025-03-03 PROCEDURE — 2500000004 HC RX 250 GENERAL PHARMACY W/ HCPCS (ALT 636 FOR OP/ED): Performed by: NURSE ANESTHETIST, CERTIFIED REGISTERED

## 2025-03-03 PROCEDURE — 45378 DIAGNOSTIC COLONOSCOPY: CPT | Performed by: SURGERY

## 2025-03-03 PROCEDURE — 3700000001 HC GENERAL ANESTHESIA TIME - INITIAL BASE CHARGE

## 2025-03-03 RX ORDER — SODIUM CHLORIDE 0.9 % (FLUSH) 0.9 %
SYRINGE (ML) INJECTION AS NEEDED
Status: DISCONTINUED | OUTPATIENT
Start: 2025-03-03 | End: 2025-03-03

## 2025-03-03 RX ORDER — LIDOCAINE HYDROCHLORIDE 20 MG/ML
INJECTION, SOLUTION INFILTRATION; PERINEURAL AS NEEDED
Status: DISCONTINUED | OUTPATIENT
Start: 2025-03-03 | End: 2025-03-03

## 2025-03-03 RX ORDER — PROPOFOL 10 MG/ML
INJECTION, EMULSION INTRAVENOUS AS NEEDED
Status: DISCONTINUED | OUTPATIENT
Start: 2025-03-03 | End: 2025-03-03

## 2025-03-03 RX ORDER — ASPIRIN 81 MG/1
81 TABLET ORAL DAILY
COMMUNITY

## 2025-03-03 RX ADMIN — PROPOFOL 30 MG: 10 INJECTION, EMULSION INTRAVENOUS at 10:10

## 2025-03-03 RX ADMIN — LIDOCAINE HYDROCHLORIDE 2 ML: 20 INJECTION, SOLUTION INFILTRATION; PERINEURAL at 09:57

## 2025-03-03 RX ADMIN — PROPOFOL 100 MG: 10 INJECTION, EMULSION INTRAVENOUS at 09:57

## 2025-03-03 RX ADMIN — PROPOFOL 50 MG: 10 INJECTION, EMULSION INTRAVENOUS at 10:04

## 2025-03-03 RX ADMIN — Medication 5 ML: at 10:12

## 2025-03-03 RX ADMIN — PROPOFOL 50 MG: 10 INJECTION, EMULSION INTRAVENOUS at 10:07

## 2025-03-03 RX ADMIN — PROPOFOL 50 MG: 10 INJECTION, EMULSION INTRAVENOUS at 10:00

## 2025-03-03 SDOH — HEALTH STABILITY: MENTAL HEALTH: CURRENT SMOKER: 0

## 2025-03-03 ASSESSMENT — PAIN SCALES - GENERAL
PAINLEVEL_OUTOF10: 0 - NO PAIN
PAIN_LEVEL: 0

## 2025-03-03 ASSESSMENT — COLUMBIA-SUICIDE SEVERITY RATING SCALE - C-SSRS
2. HAVE YOU ACTUALLY HAD ANY THOUGHTS OF KILLING YOURSELF?: NO
1. IN THE PAST MONTH, HAVE YOU WISHED YOU WERE DEAD OR WISHED YOU COULD GO TO SLEEP AND NOT WAKE UP?: NO
6. HAVE YOU EVER DONE ANYTHING, STARTED TO DO ANYTHING, OR PREPARED TO DO ANYTHING TO END YOUR LIFE?: NO

## 2025-03-03 ASSESSMENT — PAIN - FUNCTIONAL ASSESSMENT
PAIN_FUNCTIONAL_ASSESSMENT: 0-10

## 2025-03-03 NOTE — H&P
History Of Present Illness  Ana Bustillos is a 64 y.o. female presenting for colonoscopy.     Past Medical History  Past Medical History:   Diagnosis Date    Cancer (Multi) 2021    Cataract 2019    GERD (gastroesophageal reflux disease)     Hypertension     Personal history of COVID-19 01/25/2022    History of COVID-19    Personal history of other diseases of urinary system     History of hematuria    Personal history of other malignant neoplasm of kidney     History of renal carcinoma    Personal history of other malignant neoplasm of skin     History of other malignant neoplasm of skin    Urinary tract infection, site not specified 03/16/2021    Acute lower UTI       Surgical History  Past Surgical History:   Procedure Laterality Date    EYE SURGERY      OTHER SURGICAL HISTORY  01/16/2020    Excision of basal cell carcinoma    OTHER SURGICAL HISTORY  07/27/2021    Kidney surgery    OTHER SURGICAL HISTORY  07/27/2021    Nephrectomy    OTHER SURGICAL HISTORY  03/16/2021    Cataract surgery        Social History  She reports that she has never smoked. She has never used smokeless tobacco. She reports current alcohol use of about 2.0 standard drinks of alcohol per week. She reports that she does not use drugs.    Family History  Family History   Problem Relation Name Age of Onset    Cancer Brother Londno Radha     Hypertension Brother Yunior Radha     Hypertension Brother Marcial Saavedrakman         Allergies  Penicillins and Sulfa (sulfonamide antibiotics)    Review of Systems     Physical Exam  Eyes:      Extraocular Movements: Extraocular movements intact.      Pupils: Pupils are equal, round, and reactive to light.   Cardiovascular:      Rate and Rhythm: Normal rate and regular rhythm.   Pulmonary:      Effort: Pulmonary effort is normal.   Abdominal:      Palpations: Abdomen is soft.   Skin:     General: Skin is warm and dry.   Neurological:      Mental Status: She is alert.   Psychiatric:         Mood and  "Affect: Mood normal.         Behavior: Behavior normal.          Last Recorded Vitals  Blood pressure (!) 147/95, pulse 82, temperature 36.8 °C (98.2 °F), temperature source Temporal, resp. rate 16, height 1.676 m (5' 6\"), weight 97.1 kg (214 lb), SpO2 97%.    Relevant Results             Assessment/Plan   Assessment & Plan  Screen for colon cancer      Here for colonscopy       I spent 15 minutes in the professional and overall care of this patient.      Kirti Souza MD    "

## 2025-03-03 NOTE — DISCHARGE INSTRUCTIONS
Patient Instructions after a Colonoscopy      The anesthetics, sedatives or narcotics which were given to you today will be acting in your body for the next 24 hours, so you might feel a little sleepy or groggy.  This feeling should slowly wear off. Carefully read and follow the instructions.     You received sedation today:  - Do not drive or operate any machinery or power tools of any kind.   - No alcoholic beverages today, not even beer or wine.  - Do not make any important decisions or sign any legal documents.  - No over the counter medications that contain alcohol or that may cause drowsiness.  - Do not make any important decisions or sign any legal documents.  - Make sure you have someone with you for first 24 hours.    While it is common to experience mild to moderate abdominal distention, gas, or belching after your procedure, if any of these symptoms occur following discharge from the GI Lab or within one week of having your procedure, call the [facility] to be advised whether a visit to your nearest Urgent Care or Emergency Department is indicated.  Take this paper with you if you go.     - If you develop an allergic reaction to the medications that were given during your procedure such as difficulty breathing, rash, hives, severe nausea, vomiting or lightheadedness.  - If you experience chest pain, shortness of breath, severe abdominal pain, fevers and chills.  -If you develop signs and symptoms of bleeding such as blood in your spit, if your stools turn black, tarry, or bloody  - If you have not urinated within 8 hours following your procedure.  - If your IV site becomes painful, red, inflamed, or looks infected.    If you received a biopsy/polypectomy/sphincterotomy the following instructions apply below:    __ Do not use Aspirin containing products, non-steroidal medications or anti-coagulants for one week following your procedure. (Examples of these types of medications are: Advil,  Arthrotec, Aleve, Coumadin, Ecotrin, Heparin, Ibuprofen, Indocin, Motrin, Naprosyn, Nuprin, Plavix, Vioxx, and Voltarin, or their generic forms.  This list is not all-inclusive.  Check with your physician or pharmacist before resuming medications.)   __ Eat a soft diet today.  Avoid foods that are poorly digested for the next 24 hours.  These foods would include: nuts, beans, lettuce, red meats, and fried foods. Start with liquids and advance your diet as tolerated, gradually work up to eating solids.   __ Do not have a Barium Study or Enema for one week.    Your physician recommends the additional following instructions:    -You have a contact number available for emergencies. The signs and symptoms of potential delayed complications were discussed with you. You may return to normal activities tomorrow.  -Resume your previous diet.  -Continue your present medications.   -We are waiting for your pathology results.  -Your physician has recommended a repeat colonoscopy (date to be determined after pending pathology results are reviewed) for surveillance based on pathology results.  -The findings and recommendations have been discussed with you.  -The findings and recommendations were discussed with your family.  - Please see Medication Reconciliation Form for new medication/medications prescribed.       If you experience any problems or have any questions following discharge from the GI Lab, please call:        Nurse Signature                                                                        Date___________________                                                                            Patient/Responsible Party Signature                                        Date___________________

## 2025-03-03 NOTE — ANESTHESIA PREPROCEDURE EVALUATION
Patient: Ana Bustillos    Procedure Information       Date/Time: 03/03/25 1030    Scheduled providers: Kirti Souza MD    Procedure: COLONOSCOPY    Location: [facility]            Relevant Problems   Anesthesia (within normal limits)      Cardiac   (+) Essential hypertension   (+) Hyperlipidemia   (+) PVC (premature ventricular contraction)      Pulmonary (within normal limits)      Neuro (within normal limits)      GI   (+) Gastroesophageal reflux disease without esophagitis      /Renal   (+) Malignant neoplasm of right kidney (Multi)      Liver (within normal limits)      Endocrine (within normal limits)      Hematology (within normal limits)       Clinical information reviewed:   Tobacco  Allergies  Meds   Med Hx  Surg Hx   Fam Hx  Soc Hx        NPO Detail:  NPO/Void Status  Date of Last Liquid: 03/03/25  Time of Last Liquid: 0530  Date of Last Solid: 03/01/25  Time of Last Solid: 1600  Last Intake Type: Clear fluids  Time of Last Void: 0910         Physical Exam    Airway  Mallampati: II  TM distance: >3 FB  Neck ROM: full     Cardiovascular - normal exam  Rhythm: regular  Rate: normal     Dental - normal exam     Pulmonary - normal exam     Abdominal - normal exam             Anesthesia Plan    History of general anesthesia?: yes  History of complications of general anesthesia?: no    ASA 3     MAC     The patient is not a current smoker.    intravenous induction   Anesthetic plan and risks discussed with patient.

## 2025-03-03 NOTE — ANESTHESIA POSTPROCEDURE EVALUATION
Patient: Ana GLASS Podojil    Procedure Summary       Date: 03/03/25 Room / Location: [facility]    Anesthesia Start: 0946 Anesthesia Stop: 1018    Procedure: COLONOSCOPY Diagnosis: Screen for colon cancer    Scheduled Providers: Kirti Souza MD Responsible Provider: ESME Dove    Anesthesia Type: MAC ASA Status: 3            Anesthesia Type: MAC    Vitals Value Taken Time   /77 03/03/25 1037   Temp 36.7 °C (98.1 °F) 03/03/25 1037   Pulse 68 03/03/25 1037   Resp 18 03/03/25 1037   SpO2 93 % 03/03/25 1037       Anesthesia Post Evaluation    Patient location during evaluation: bedside  Patient participation: complete - patient participated  Level of consciousness: awake and alert  Pain score: 0  Pain management: adequate  Airway patency: patent  Cardiovascular status: acceptable and hemodynamically stable  Respiratory status: acceptable  Hydration status: acceptable  Postoperative Nausea and Vomiting: none        There were no known notable events for this encounter.

## 2025-03-15 DIAGNOSIS — E78.5 HYPERLIPIDEMIA, UNSPECIFIED HYPERLIPIDEMIA TYPE: ICD-10-CM

## 2025-03-15 DIAGNOSIS — I49.3 VENTRICULAR PREMATURE BEATS: ICD-10-CM

## 2025-03-16 RX ORDER — ATENOLOL 25 MG/1
25 TABLET ORAL DAILY
Qty: 90 TABLET | Refills: 1 | Status: SHIPPED | OUTPATIENT
Start: 2025-03-16

## 2025-03-16 RX ORDER — PRAVASTATIN SODIUM 40 MG/1
40 TABLET ORAL DAILY
Qty: 90 TABLET | Refills: 1 | Status: SHIPPED | OUTPATIENT
Start: 2025-03-16

## 2025-03-25 DIAGNOSIS — E78.5 HYPERLIPIDEMIA, UNSPECIFIED HYPERLIPIDEMIA TYPE: ICD-10-CM

## 2025-03-25 RX ORDER — OMEGA-3-ACID ETHYL ESTERS 1 G/1
2 CAPSULE, LIQUID FILLED ORAL 2 TIMES DAILY
Qty: 90 CAPSULE | Refills: 1 | Status: SHIPPED | OUTPATIENT
Start: 2025-03-25

## 2025-03-28 DIAGNOSIS — M85.80 OSTEOPENIA, UNSPECIFIED LOCATION: ICD-10-CM

## 2025-03-28 RX ORDER — ALENDRONATE SODIUM TABLET 35 MG/1
TABLET ORAL
Qty: 84 TABLET | Refills: 1 | Status: SHIPPED | OUTPATIENT
Start: 2025-03-28

## 2025-04-16 DIAGNOSIS — M25.551 RIGHT HIP PAIN: ICD-10-CM

## 2025-04-17 ENCOUNTER — OFFICE VISIT (OUTPATIENT)
Dept: ORTHOPEDIC SURGERY | Facility: CLINIC | Age: 65
End: 2025-04-17
Payer: MEDICARE

## 2025-04-17 ENCOUNTER — HOSPITAL ENCOUNTER (OUTPATIENT)
Dept: RADIOLOGY | Facility: CLINIC | Age: 65
Discharge: HOME | End: 2025-04-17
Payer: MEDICARE

## 2025-04-17 ENCOUNTER — PREP FOR PROCEDURE (OUTPATIENT)
Dept: ORTHOPEDIC SURGERY | Facility: CLINIC | Age: 65
End: 2025-04-17

## 2025-04-17 VITALS — BODY MASS INDEX: 33.59 KG/M2 | HEIGHT: 67 IN | WEIGHT: 214 LBS

## 2025-04-17 DIAGNOSIS — M16.11 PRIMARY OSTEOARTHRITIS OF RIGHT HIP: ICD-10-CM

## 2025-04-17 DIAGNOSIS — M25.551 RIGHT HIP PAIN: ICD-10-CM

## 2025-04-17 DIAGNOSIS — M16.11 ARTHRITIS OF RIGHT HIP: Primary | ICD-10-CM

## 2025-04-17 PROCEDURE — 1125F AMNT PAIN NOTED PAIN PRSNT: CPT | Performed by: SPECIALIST

## 2025-04-17 PROCEDURE — 99204 OFFICE O/P NEW MOD 45 MIN: CPT | Performed by: SPECIALIST

## 2025-04-17 PROCEDURE — 3008F BODY MASS INDEX DOCD: CPT | Performed by: SPECIALIST

## 2025-04-17 PROCEDURE — 73502 X-RAY EXAM HIP UNI 2-3 VIEWS: CPT | Mod: RIGHT SIDE | Performed by: RADIOLOGY

## 2025-04-17 PROCEDURE — 73502 X-RAY EXAM HIP UNI 2-3 VIEWS: CPT | Mod: RT

## 2025-04-17 PROCEDURE — 1159F MED LIST DOCD IN RCRD: CPT | Performed by: SPECIALIST

## 2025-04-17 PROCEDURE — 1123F ACP DISCUSS/DSCN MKR DOCD: CPT | Performed by: SPECIALIST

## 2025-04-17 ASSESSMENT — ENCOUNTER SYMPTOMS
DEPRESSION: 0
OCCASIONAL FEELINGS OF UNSTEADINESS: 0
LOSS OF SENSATION IN FEET: 0

## 2025-04-17 ASSESSMENT — PAIN - FUNCTIONAL ASSESSMENT: PAIN_FUNCTIONAL_ASSESSMENT: 0-10

## 2025-04-17 ASSESSMENT — PAIN SCALES - GENERAL: PAINLEVEL_OUTOF10: 3

## 2025-04-17 NOTE — PROGRESS NOTES
NPV patient is presenting with right hip pain. Pain began approximately 1 year ago. Pain has not increased. Pain does radiate down her leg. Denies back pain. Denies numbness and tingling. No known injury/falls, no surgery or injections. No additional pain on palpation. Patient did complete PT at  [facility]. Therapist at [facility] did tell patient that one leg is shorter than the other. Xr done today.     EXAM    GENERAL: A/Ox3, NAD. Appears healthy, well nourished  PSYCHIATRIC: Mood stable, appropriate memory recall  SKIN: no erythema, rashes, or ecchymoses     MUSCULOSKELETAL:  Laterality: Right hip Exam  - ROM: Restricted with pain  - Strength: Abduction 5/5 against resitance, Flexion 5/5  - Palpation: mild TTP along greater trochanter  - Log roll/IR exam: painful and limited motion. Pain with hip flexion past 90 degrees and internal rotation  - Straight leg raise: negative  - EHL/PF/DF motor intact  - Gait: antalgic to arthritic side, mild Trendelenburg gait   - Special Tests: none performed    NEUROVASCULAR:  - Neurovascular Status: sensation intact to light touch distally  - Capillary refill brisk at extremities, Bilateral dorsalis pedis pulse 2+    RADIOGRAPHS: Severe osteoarthritic change right hip     ASSESSMENT: Right hip severe osteoarthritis    PLAN: This is affecting her daily activities and sleep and she has failed multiple conservative measures, and wishes to proceed with a right total hip arthroplasty.  We had a lengthy discussion regarding the risk and benefit of surgery, the alternatives, limitations, and personnel involved. The include but are not limited to infection, persistent pain, instability, nerve injury, blood clots, and medical complications. We also discussed the pre-operative course, surgery itself, and rehabilitation.  Complete informed consent was obtained from the patient. Perioperative blood management and transfusion issues were discussed, and options clearly outlined.      The  patient has elected to schedule surgery at this time or intents to call the office with a surgical date. Shared decision making occurred while obtaining informed consent. The patient will be ordered appropriate preoperative labs to be completed for preadmission testing, if necessary.      This note was dictated using speech recognition software and was not corrected for spelling or grammatical errors

## 2025-04-23 ENCOUNTER — PATIENT MESSAGE (OUTPATIENT)
Dept: PRIMARY CARE | Facility: CLINIC | Age: 65
End: 2025-04-23
Payer: MEDICARE

## 2025-05-08 DIAGNOSIS — E78.5 HYPERLIPIDEMIA, UNSPECIFIED HYPERLIPIDEMIA TYPE: ICD-10-CM

## 2025-05-08 RX ORDER — OMEGA-3-ACID ETHYL ESTERS 1 G/1
2 CAPSULE, LIQUID FILLED ORAL 2 TIMES DAILY
Qty: 365 CAPSULE | Refills: 3 | Status: SHIPPED | OUTPATIENT
Start: 2025-05-08

## 2025-06-16 ENCOUNTER — TELEPHONE (OUTPATIENT)
Dept: PREOP | Facility: HOSPITAL | Age: 65
End: 2025-06-16
Payer: MEDICARE

## 2025-06-16 NOTE — TELEPHONE ENCOUNTER
Thank you for attending our Joint Replacement class today in preparation for your upcoming surgery.  Topics discussed include:     MyChart Enrollment  Communication with Care Team  My Chart is the best form of communication to reach all of your caregivers  You can send messages to specific care givers, or a care team  Continued Education  You will be enrolled in a Total Joint Replacement care plan to receive additional education before and after surgery  You can review a short recording of the class content  Access to Medical Records  You can access test results, office notes, appointments, etc.  You can connect to other healthcare systems who use [employer] ([facility], [facility], [facility], etc.)  [employer]  Program Information  Consent to Enroll     Background/Understanding of Joint Replacement Surgery  Potential for same day discharge  Any questions or concerns to be directed to the surgeon's office     How to Prepare for Surgery  Use of Nicotine Products/Smoking  Stop several weeks before surgery  Such products slow down the healing process and increase risk of post-op infection and complications  Clearance for Surgery  Medical Clearance by Specialists  Dental Clearance  Cracked/Broken/Loose teeth left untreated may postpone surgery  The importance of post-op antibiotics for dental visits per surgeon protocol  Preadmission Testing  **Potential for postponed surgery if appropriate clearance is not obtained  Medication Instruction  Follow instructions provided by the doctor who prescribes your medication (typically, but not limited to cardiologist)  Preadmission testing will provide additional instructions during your appointment on what to stop and what to take as you get closer to surgery  For clarification of these instructions, please call preadmission testing directly - [phone]  Tips for Preparing the home for discharge from the hospital  Care Partner  Requirement for surgery, the patient must have a plan to have  help at home  Potential for postponed surgery if plan for home support cannot be established  How the care partner can help after surgery  CHG Body Wash/Mouth Wash  Follow the instructions given at preadmission testing  Body wash is to be used on the body and hair for 5 washes  Mouthwash is to be used the night before and morning of surgery  **This is a system-wide protocol developed by infectious disease professionals, we will not alter our recommendations for those with sensitive skin or those who have special hair needs.  Please follow the instructions as they are written as this will provide the best infection prevention measures for surgery.  Should you have an allergy to one of the products, please discuss with your preadmission team**     What to Expect in the Hospital/At Home  Morning of Surgery NPO Guidelines  Nothing to eat after midnight  Surgical and Post-Surgical Care Team  Surgical Team  Anesthesia Team  Nursing  Physical Therapy  Care Coordinating  Pharmacy  Hospital Arrival Instructions  Arrive at the time provided to you  Consider traffic patterns (rush-hour) based on arrival time  Have arrangements made for a ride home  If discharging same day, care partner should remain at the hospital  Recovering after Surgery  Recovery Room - Visitors are not brought back  Transition to hospital room - 2nd Floor, Visitors will be directed to your room  The presence of and strategies for controlling surgical pain and swelling  The importance of early mobility  Side effects after surgery  What to expect if staying overnight     Discharge Planning  The intended plan for discharge will be for patients to discharge home  All patients require a care partner (family, friend, neighbor, etc.) to stay with the patient for the first few nights after surgery  The inability to secure help at home will postpone surgery  Home Care Services set up per surgeon order  Physical Therapy  Occupational Therapy  **If desired, private  duty care can be arranged by the patient ahead of time**  Outpatient Physical Therapy per surgeon order     Recovering at Home  Wound Care  Follow wound care instructions found in your discharge paperwork  Bandage is water-resistant and you may shower with the bandage  Do not scrub directly over the bandage  Do not submerge in water until cleared (bathtub, hot tub, pool, etc.)     Post-Op Risk Prevention  Infection Prevention  Promptly seek treatment for any infections post-operatively  Routine dental visits must be postponed for 3 months after surgery  Your surgeon may require antibiotics prior to future dental visits  Any concerns for infection not related directly to the knee or the hip should be managed by your primary care provider  Blood Clots  Be sure to complete the course of blood thinning medication as prescribed by your surgeon  Movement every 1-2 hours during the day is encouraged to prevent blood clots  Monitor for signs of blood clots  Wear compression stockings as prescribed by your surgeon  Constipation  Constipation is common following surgery  Drink plenty of fluids  Take stool softener/laxative as prescribed by your surgeon  Move around frequently  Eat foods high in fiber  Fall Prevention  Prepare home ahead of time to clear space to move with walker  Remove throw rugs and electrical cords from walkways  Install railings near any stairways with more than 2 steps  Use night lights for increased visibility at night  Continue to use your assistive device until cleared by surgeon or physical therapy  Dislocation Prevention - Not all procedures will have dislocation precautions  Follow dislocation precautions provided by your surgeon  It is OK to resume sexual activity about 6 weeks following surgery  Be sure to follow any dislocation precautions assigned     Durable Medical Equipment  Cold Therapy  Breg Cold Therapy Machines  Ice/Gel Packs  Assistive Devices  Folding Walker with Wheels (in the front  only)  No Rollators  Crutches if approved by Physical Therapy and Surgeon after surgery  Hip Kits  Raised Toilet Seats  Additional Compression Stockings     Joint Preservation  Healthy Activities when Cleared  Walking  Swimming  Bike Riding  Activities to Avoid  Refrain from repetitive motions which have a high impact on the joint  Gradual Progression  Progress activity slowly, listen to your body  Common Findings - NORMAL after surgery  Clicking/Grinding  Numbness near incision     Physical Therapy  Prehabilitation exercises  START TODAY ON BOTH LEGS  Surgery Specific Precautions  Follow surgery specific precautions found in your discharge paperwork     Follow-Up Visit  All patients will see their surgeon for a follow up visit after surgery  The visit may range from 2-6 weeks after surgery and is surgeon specific        Please don't hesitate to reach out if you have any additional questions or concerns.    Lashaun Suarez [phone]

## 2025-06-18 RX ORDER — CHLORHEXIDINE GLUCONATE ORAL RINSE 1.2 MG/ML
SOLUTION DENTAL
Qty: 120 ML | Refills: 0 | Status: SHIPPED | OUTPATIENT
Start: 2025-06-18

## 2025-06-23 ENCOUNTER — PRE-ADMISSION TESTING (OUTPATIENT)
Dept: PREADMISSION TESTING | Facility: HOSPITAL | Age: 65
End: 2025-06-23
Payer: MEDICARE

## 2025-06-23 VITALS
BODY MASS INDEX: 33.59 KG/M2 | HEART RATE: 70 BPM | DIASTOLIC BLOOD PRESSURE: 88 MMHG | RESPIRATION RATE: 16 BRPM | TEMPERATURE: 99 F | WEIGHT: 209 LBS | HEIGHT: 66 IN | OXYGEN SATURATION: 98 % | SYSTOLIC BLOOD PRESSURE: 146 MMHG

## 2025-06-23 DIAGNOSIS — M16.11 PRIMARY OSTEOARTHRITIS OF RIGHT HIP: ICD-10-CM

## 2025-06-23 DIAGNOSIS — Z01.818 PRE-OP EVALUATION: Primary | ICD-10-CM

## 2025-06-23 DIAGNOSIS — I10 ESSENTIAL HYPERTENSION: ICD-10-CM

## 2025-06-23 DIAGNOSIS — G47.33 OBSTRUCTIVE SLEEP APNEA: ICD-10-CM

## 2025-06-23 LAB
ABO GROUP (TYPE) IN BLOOD: NORMAL
ANTIBODY SCREEN: NORMAL
RH FACTOR (ANTIGEN D): NORMAL

## 2025-06-23 PROCEDURE — 87081 CULTURE SCREEN ONLY: CPT | Mod: PORLAB

## 2025-06-23 PROCEDURE — 86850 RBC ANTIBODY SCREEN: CPT

## 2025-06-23 PROCEDURE — 36415 COLL VENOUS BLD VENIPUNCTURE: CPT

## 2025-06-23 PROCEDURE — 99203 OFFICE O/P NEW LOW 30 MIN: CPT | Performed by: CLINICAL NURSE SPECIALIST

## 2025-06-23 ASSESSMENT — LIFESTYLE VARIABLES: SMOKING_STATUS: NONSMOKER

## 2025-06-23 NOTE — CPM/PAT H&P
CPM/PAT Evaluation       Name: Ana Bustillos (Ana Bustillos)  /Age: 1960/64 y.o.     In-Person       Chief Complaint: Scheduled for right total hip arthroplasty under general/ spinal anesthesia per Dr. Bliss on 25.       Past Medical History:   Diagnosis Date    Cataract 2019    GERD (gastroesophageal reflux disease)     Hypertension     Personal history of COVID-19 2022    History of COVID-19    Personal history of other diseases of urinary system     History of hematuria    Personal history of other malignant neoplasm of kidney     History of renal carcinoma    Personal history of other malignant neoplasm of skin     History of other malignant neoplasm of skin    Renal cancer (Multi)     right    Sleep apnea     Urinary tract infection, site not specified 2021    Acute lower UTI       Past Surgical History:   Procedure Laterality Date    CATARACT EXTRACTION      COLONOSCOPY      OTHER SURGICAL HISTORY  2020    Excision of basal cell carcinoma    OTHER SURGICAL HISTORY  2021    Kidney surgery    OTHER SURGICAL HISTORY  2021    Nephrectomy       Patient Sexual activity questions deferred to the physician.    Family History   Problem Relation Name Age of Onset    Cancer Brother London Garcia     Hypertension Brother Yunior Radha     Diabetes Brother Yunior Radha     Hypertension Brother Marcial Radha        Allergies   Allergen Reactions    Penicillins Hives    Sulfa (Sulfonamide Antibiotics) Hives       Prior to Admission medications    Medication Sig Start Date End Date Taking? Authorizing Provider   alendronate (Fosamax) 35 mg tablet TAKE 1 TABLET BY MOUTH 1 TIME PER WEEK. 3/28/25   LUKE Mcdonald-CNS   aspirin 81 mg EC tablet Take 1 tablet (81 mg) by mouth once daily.    Historical Provider, MD   atenolol (Tenormin) 25 mg tablet TAKE 1 TABLET BY MOUTH EVERY DAY 3/16/25   LUKE Mcdonald-CNS   chlorhexidine (Peridex) 0.12 % solution Swish and Spit 15  "ml the night before and the morning of surgery. (2 Doses) 6/18/25   ALDO Souza   famotidine (Pepcid) 20 mg tablet Take 1 tablet (20 mg) by mouth once daily. 11/8/24 8/5/25  Historical Provider, MD   lisinopril 20 mg tablet TAKE 1 TABLET BY MOUTH ONCE DAILY. AS DIRECTED 1/3/25   ALDO Mcdonald   omega-3 acid ethyl esters (Lovaza) 1 gram capsule TAKE 2 CAPSULES BY MOUTH 2 TIMES A DAY. 5/8/25   ALDO Mcdonald   pravastatin (Pravachol) 40 mg tablet TAKE 1 TABLET BY MOUTH EVERY DAY 3/16/25   ALDO Mcdonald        Visit Vitals  /88   Pulse 70   Temp 37.2 °C (99 °F) (Temporal)   Resp 16   Ht 1.676 m (5' 6\")   Wt 94.8 kg (209 lb)   SpO2 98%   BMI 33.73 kg/m²   OB Status Postmenopausal   Smoking Status Never   BSA 2.1 m²       DASI Risk Score      Flowsheet Row Questionnaire Series Submission from 4/17/2025 in [facility] OR with Generic Provider Giulia   Can you take care of yourself (eat, dress, bathe, or use toilet)?  2.75  filed at 04/17/2025 1837   Can you walk indoors, such as around your house? 1.75  filed at 04/17/2025 1837   Can you walk a block or two on level ground?  2.75  filed at 04/17/2025 1837   Can you climb a flight of stairs or walk up a hill? 5.5  filed at 04/17/2025 1837   Can you run a short distance? 0  filed at 04/17/2025 1837   Can you do light work around the house like dusting or washing dishes? 2.7  filed at 04/17/2025 1837   Can you do moderate work around the house like vacuuming, sweeping floors or carrying groceries? 3.5  filed at 04/17/2025 1837   Can you do heavy work around the house like scrubbing floors or lifting and moving heavy furniture?  8  filed at 04/17/2025 1837   Can you do yard work like raking leaves, weeding or pushing a mower? 0  filed at 04/17/2025 1837   Can you have sexual relations? 5.25  filed at 04/17/2025 1837   Can you participate in moderate recreational activities like golf, bowling, dancing, doubles " tennis or throwing a baseball or football? 0  filed at 04/17/2025 1837   Can you participate in strenous sports like swimming, singles tennis, football, basketball, or skiing? 0  filed at 04/17/2025 1837   DASI SCORE 32.2  filed at 04/17/2025 1837   METS Score (Will be calculated only when all the questions are answered) 6.7  filed at 04/17/2025 1837          Caprini DVT Assessment      Flowsheet Row Pre-Admission Testing from 6/23/2025 in  [facility]   DVT Score (IF A SCORE IS NOT CALCULATING, MUST SELECT A BMI TO COMPLETE) 12 filed at 06/23/2025 0811   Surgical Factors Major surgery planned, including arthroscopic and laproscopic (1-2 hours), Elective major lower extremity arthroplasty filed at 06/23/2025 0811   BMI (BMI MUST BE CHOSEN) 31-40 (Obesity) filed at 06/23/2025 0811          Modified Frailty Index    No data to display       [id number] Stroke Risk Points  Current as of just now        N/A 0 to 9 Points:      Last Change: N/A          The [id number] risk score (Lip CAROL, et al. 2009. © 2010 American College [employer]) quantifies the risk of stroke for a patient with atrial fibrillation. For patients without atrial fibrillation or under the age of 18 this score appears as N/A. Higher score values generally indicate higher risk of stroke.        This score is not applicable to this patient. Components are not calculated.          Revised Cardiac Risk Index      Flowsheet Row Pre-Admission Testing from 6/23/2025 in  [facility]   High-Risk Surgery (Intraperitoneal, Intrathoracic,Suprainguinal vascular) 1 filed at 06/23/2025 0816   History of ischemic heart disease (History of MI, History of positive exercuse test, Current chest paint considered due to myocardial ischemia, Use of nitrate therapy, ECG with pathological Q Waves) 0 filed at 06/23/2025 0816   History of congestive heart failure (pulmonary edemia, bilateral rales or S3 gallop, Paroxysmal nocturnal dyspnea,  CXR showing pulmonary vascular redistribution) 0 filed at 06/23/2025 0816   History of cerebrovascular disease (Prior TIA or stroke) 0 filed at 06/23/2025 0816   Pre-operative insulin treatment 0 filed at 06/23/2025 0816   Pre-operative creatinine>2 mg/dl 0 filed at 06/23/2025 0816   Revised Cardiac Risk Calculator 1 filed at 06/23/2025 0816          Apfel Simplified Score      Flowsheet Row Pre-Admission Testing from 6/23/2025 in  [facility]   Smoking status 1 filed at 06/23/2025 0816   History of motion sickness or PONV  0 filed at 06/23/2025 0816   Use of postoperative opioids 1 filed at 06/23/2025 0816   Gender - Female 1=Yes filed at 06/23/2025 0816   Apfel Simplified Score Calculator 3 filed at 06/23/2025 0816          Risk Analysis Index Results This Encounter    No data found in the last 10 encounters.       Stop Bang Score      Flowsheet Row Pre-Admission Testing from 6/23/2025 in  [facility]   Do you snore loudly? 0  [not with cpap use] filed at 06/23/2025 0738   Do you often feel tired or fatigued after your sleep? 0 filed at 06/23/2025 0738   Has anyone ever observed you stop breathing in your sleep? 1 filed at 06/23/2025 0738   Do you have or are you being treated for high blood pressure? 1 filed at 06/23/2025 0738   Recent BMI (Calculated) 34 filed at 06/23/2025 0738   Is BMI greater than 35 kg/m2? 0=No filed at 06/23/2025 0738   Age older than 50 years old? 1=Yes filed at 06/23/2025 0738   Is your neck circumference greater than 17 inches (Male) or 16 inches (Female)? 0 filed at 06/23/2025 0738   Gender - Male 0=No filed at 06/23/2025 0738   STOP-BANG Total Score 3 filed at 06/23/2025 0738          Prodigy: High Risk  Total Score: 8              Prodigy Age Score           ARISCAT Score for Postoperative Pulmonary Complications      Flowsheet Row Pre-Admission Testing from 6/23/2025 in  [facility]   Age Calculated Score 3 filed at 06/23/2025 0816    Preoperative SpO2 0 filed at 06/23/2025 0816   Respiratory infection in the last month Either upper or lower (i.e., URI, bronchitis, pneumonia), with fever and antibiotic treatment 0 filed at 06/23/2025 0816   Preoperative anemia (Hgb less than 10 g/dl) 0 filed at 06/23/2025 0816   Surgical incision  0 filed at 06/23/2025 0816   Duration of surgery  0 filed at 06/23/2025 0816   Emergency Procedure  0 filed at 06/23/2025 0816   ARISCAT Total Score  3 filed at 06/23/2025 0816          Cecy Perioperative Risk for Myocardial Infarction or Cardiac Arrest (JOEL)      Flowsheet Row Pre-Admission Testing from 6/23/2025 in  [facility]   Calculated Age Score 1.32 filed at 06/23/2025 0816   Functional Status  0 filed at 06/23/2025 0816   ASA Class  -5.17 filed at 06/23/2025 0816   Creatinine -0.10 filed at 06/23/2025 0816   Type of Procedure  0.80 filed at 06/23/2025 0816   JOEL Total Score  -8.4 filed at 06/23/2025 0816   JOEL % 0.02 filed at 06/23/2025 0816            1. Pre-op evaluation  Staphylococcus aureus/MRSA colonization, Culture    ECG 12 Lead    chlorhexidine (Peridex) 0.12 % solution    Staphylococcus aureus/MRSA colonization, Culture    Type And Screen Is this order related to pregnancy or an upcoming surgery? Yes; Where will this surgery/delivery be performed? [facility]; What is the date of the surgery? 7/7/2025; Has this patient ever had a transfusion? No; Has this...    Type And Screen Is this order related to pregnancy or an upcoming surgery? Yes; Where will this surgery/delivery be performed? [facility]; What is the date of the surgery? 7/7/2025; Has this patient ever had a transfusion? No; Has this...      2. Primary osteoarthritis of right hip  Staphylococcus aureus/MRSA colonization, Culture    ECG 12 Lead    chlorhexidine (Peridex) 0.12 % solution    Staphylococcus aureus/MRSA colonization, Culture    Type And Screen Is this order related to pregnancy or an  upcoming surgery? Yes; Where will this surgery/delivery be performed? [facility]; What is the date of the surgery? 7/7/2025; Has this patient ever had a transfusion? No; Has this...    Type And Screen Is this order related to pregnancy or an upcoming surgery? Yes; Where will this surgery/delivery be performed? [facility]; What is the date of the surgery? 7/7/2025; Has this patient ever had a transfusion? No; Has this...      3. Essential hypertension  Staphylococcus aureus/MRSA colonization, Culture    ECG 12 Lead    chlorhexidine (Peridex) 0.12 % solution    Staphylococcus aureus/MRSA colonization, Culture      4. Obstructive sleep apnea  Staphylococcus aureus/MRSA colonization, Culture    ECG 12 Lead    chlorhexidine (Peridex) 0.12 % solution    Staphylococcus aureus/MRSA colonization, Culture          Assessment and Plan:     Musculoskeletal:  Scheduled for right total hip arthroplasty under general/ spinal anesthesia per Dr. Bliss on 7/7/25.   CBC, CMP, lipids, A1c ordered by PCP. Awaiting results.   MRSA and T&C ordered. Results pending.   Patient had Chest CT in 4/2025, minimal scarring/opacities noted. Mild diffuse bronchia wall thickening. Mild CAD noted.   Hx of HTN, RENETTA. EKG ordered, shows SR, Rate of 68, early R wave prog in V2.   Patient has H&P and clearance appointment scheduled for 7/3/25 per ОЛЬГА BUTLER.   Patient attended her total joint class last week.  Has Hibiclens.   Peridex presecription sent to [facility] in [place].   All surgery instructions reviewed with patient by RN. Verbalized understanding.   Encouraged early ambulation, DVT/ PE prevention, constipation prevention, and C&DB post operatively. Patient verbalized understanding.

## 2025-06-23 NOTE — PREPROCEDURE INSTRUCTIONS
Medication List            Accurate as of June 23, 2025  7:50 AM. Always use your most recent med list.                alendronate 35 mg tablet  Commonly known as: Fosamax  TAKE 1 TABLET BY MOUTH 1 TIME PER WEEK.  Medication Adjustments for Surgery: Do Not take on the morning of surgery     aspirin 81 mg EC tablet  Medication Adjustments for Surgery: Do Not take on the morning of surgery     atenolol 25 mg tablet  Commonly known as: Tenormin  TAKE 1 TABLET BY MOUTH EVERY DAY  Notes to patient: Take night before surgery like normal schedule     chlorhexidine 0.12 % solution  Commonly known as: Peridex  Swish and Spit 15 ml the night before and the morning of surgery. (2 Doses)  Medication Adjustments for Surgery: Take/Use as prescribed     famotidine 20 mg tablet  Commonly known as: Pepcid  Medication Adjustments for Surgery: Do Not take on the morning of surgery     lisinopril 20 mg tablet  TAKE 1 TABLET BY MOUTH ONCE DAILY. AS DIRECTED  Medication Adjustments for Surgery: Do Not take on the morning of surgery     omega-3 acid ethyl esters 1 gram capsule  Commonly known as: Lovaza  TAKE 2 CAPSULES BY MOUTH 2 TIMES A DAY.  Additional Medication Adjustments for Surgery: Take last dose 7 days before surgery     pravastatin 40 mg tablet  Commonly known as: Pravachol  TAKE 1 TABLET BY MOUTH EVERY DAY  Medication Adjustments for Surgery: Do Not take on the morning of surgery                 Pre Surgical Instructions:    Do not drink any liquid after midnight the night before your surgery  Do not eat any food after midnight the night before your surgery/procedure.  Candy, gum, mints and smoking of cigarettes, marijuana or vaping is not permitted after midnight prior to your surgery   Do not drink Alcohol 24 hours prior to surgery      Increase fluid intake day before surgery    Additional Instructions:      Review your medication instructions, take indicated medications    Wear  comfortable loose fitting clothing  Do  Breast cancer screening guidelines    Summary of ACOG’s Updated Recommendations for Screening Mammography (July 2017)    Women at average risk of breast cancer should be offered screening mammography starting at age 40 years. If they have not initiated screening in their 40s, they should begin screening mammography by no later than age 50 years. The decision about the age to begin mammography screening should be made through a shared decision-making process. This discussion should include information about the potential benefits and harms.    Women at average risk of breast cancer should have screening mammography every one or two years based on an informed, shared decision-making process that includes a discussion of the benefits and harms of annual and biennial screening and incorporates patient values and preferences.    Women at average risk of breast cancer should continue screening mammography until at least 75 years.       MODERATE RISK: SCREENING -- For women with moderate risk (ie, approximately 15 to 20 percent lifetime risk of breast cancer), including most women who have a family history of breast cancer in a first-degree relative but do not have a known genetic syndrome, we suggest that the age to begin mammography screening and the frequency of screening be the same as for women at average risk (see 'Average risk: screening' above). Some have suggested that screening be initiated at an earlier age if a first-degree relative had premenopausal breast cancer.     not use moisturizers, creams, lotions or perfume  All jewelry and valuables should be left at home. May bring glasses and partials.    Stop blood thinning medications as instructed by ordering physician or surgeon    Shower or bathe the night before or day of surgery.   Brush teeth and avoid perfumes, colognes, powders, makeup, aftershave and hair spray    Go to Registration, in the main lobby, upon arrival on the day of surgery and have 's license and medical insurance card available.    Call [phone] the day before your surgery/procedure to find out what time you are to arrive the next day.     Please have a responsible adult to drive you home and be available to help you as needed after surgery.         Deep Breathing Exercises after surgery:   Breathe deeply using your lungs as fully as possible to move   secretions and clear lungs more easily.  Do a cycle of five deep breaths every hour.      Start by placing your hands on your ribs and take a deep breath in through your nose, expanding your lower chest.  You should feel your ribs push against your hands.  Breathe out slowly through your mouth until all the air is gone.    For every other breath, hold your breath for three seconds.  This will help keep the lungs fully open.     Coughing : Coughing is necessary to clear secretions that may accumulate in your lungs.  This should be done after breathing exercises.    If lying down, bend your knees and support you incision with a pillow or your hands to make it more comfortable.    If sitting, support your incision, lean forward and keep your feet on the floor.  After your five deep breaths, breathe in and cough out sharply.  Repeat this cycle twice or for as long as you have secretions to clear.  ( You will not put your incision at risk by coughing.)    Leg Exercises:  Leg exercises are important to maintain good blood circulation in your legs, maintain muscle strength and prevent joint stiffness.  Do  each exercise for 5 repetitions every hour while awake for the first few days or until you are up and walking around.         A. Pump your feet up and down at the ankles        B. With your legs straight, make circles with your feet.        C. Bend and straighten your knees by sliding your heels up and down the bed.         D. With your legs straight tighten the muscle above your knee and push the back of your knee down             Into the bed.  Hold for five seconds and then relax.  Alternate legs.

## 2025-06-24 LAB
25(OH)D3+25(OH)D2 SERPL-MCNC: 64 NG/ML (ref 30–100)
ALBUMIN SERPL-MCNC: 4.2 G/DL (ref 3.6–5.1)
ALP SERPL-CCNC: 38 U/L (ref 37–153)
ALT SERPL-CCNC: 9 U/L (ref 6–29)
ANION GAP SERPL CALCULATED.4IONS-SCNC: 9 MMOL/L (CALC) (ref 7–17)
AST SERPL-CCNC: 13 U/L (ref 10–35)
BILIRUB SERPL-MCNC: 0.7 MG/DL (ref 0.2–1.2)
BUN SERPL-MCNC: 20 MG/DL (ref 7–25)
CALCIUM SERPL-MCNC: 9.6 MG/DL (ref 8.6–10.4)
CHLORIDE SERPL-SCNC: 101 MMOL/L (ref 98–110)
CHOLEST SERPL-MCNC: 137 MG/DL
CHOLEST/HDLC SERPL: 2.9 (CALC)
CO2 SERPL-SCNC: 24 MMOL/L (ref 20–32)
CREAT SERPL-MCNC: 0.85 MG/DL (ref 0.5–1.05)
EGFRCR SERPLBLD CKD-EPI 2021: 76 ML/MIN/1.73M2
ERYTHROCYTE [DISTWIDTH] IN BLOOD BY AUTOMATED COUNT: 14.5 % (ref 11–15)
EST. AVERAGE GLUCOSE BLD GHB EST-MCNC: 123 MG/DL
EST. AVERAGE GLUCOSE BLD GHB EST-SCNC: 6.8 MMOL/L
GLUCOSE SERPL-MCNC: 100 MG/DL (ref 65–99)
HBA1C MFR BLD: 5.9 %
HCT VFR BLD AUTO: 42.5 % (ref 35–45)
HCV AB SERPL QL IA: NORMAL
HDLC SERPL-MCNC: 48 MG/DL
HGB BLD-MCNC: 13.1 G/DL (ref 11.7–15.5)
LDLC SERPL CALC-MCNC: 73 MG/DL (CALC)
MCH RBC QN AUTO: 30.8 PG (ref 27–33)
MCHC RBC AUTO-ENTMCNC: 30.8 G/DL (ref 32–36)
MCV RBC AUTO: 100 FL (ref 80–100)
NONHDLC SERPL-MCNC: 89 MG/DL (CALC)
PLATELET # BLD AUTO: 227 THOUSAND/UL (ref 140–400)
PMV BLD REES-ECKER: 10 FL (ref 7.5–12.5)
POTASSIUM SERPL-SCNC: 4.1 MMOL/L (ref 3.5–5.3)
PROT SERPL-MCNC: 7 G/DL (ref 6.1–8.1)
RBC # BLD AUTO: 4.25 MILLION/UL (ref 3.8–5.1)
SODIUM SERPL-SCNC: 134 MMOL/L (ref 135–146)
TRIGL SERPL-MCNC: 82 MG/DL
TSH SERPL-ACNC: 0.97 MIU/L (ref 0.4–4.5)
WBC # BLD AUTO: 7.2 THOUSAND/UL (ref 3.8–10.8)

## 2025-06-25 LAB — STAPHYLOCOCCUS SPEC CULT: ABNORMAL

## 2025-06-27 ENCOUNTER — OFFICE VISIT (OUTPATIENT)
Dept: PRIMARY CARE | Facility: CLINIC | Age: 65
End: 2025-06-27
Payer: MEDICARE

## 2025-06-27 ENCOUNTER — ANESTHESIA EVENT (OUTPATIENT)
Dept: OPERATING ROOM | Facility: HOSPITAL | Age: 65
End: 2025-06-27
Payer: MEDICARE

## 2025-06-27 VITALS
HEIGHT: 66 IN | HEART RATE: 67 BPM | WEIGHT: 207 LBS | SYSTOLIC BLOOD PRESSURE: 110 MMHG | BODY MASS INDEX: 33.27 KG/M2 | DIASTOLIC BLOOD PRESSURE: 70 MMHG

## 2025-06-27 DIAGNOSIS — Z01.818 PREOPERATIVE CLEARANCE: ICD-10-CM

## 2025-06-27 DIAGNOSIS — N18.31 HYPERTENSIVE KIDNEY DISEASE WITH STAGE 3A CHRONIC KIDNEY DISEASE (MULTI): Primary | ICD-10-CM

## 2025-06-27 DIAGNOSIS — R73.02 IMPAIRED GLUCOSE TOLERANCE: ICD-10-CM

## 2025-06-27 DIAGNOSIS — I49.3 VENTRICULAR PREMATURE BEATS: ICD-10-CM

## 2025-06-27 DIAGNOSIS — M85.80 OSTEOPENIA, UNSPECIFIED LOCATION: ICD-10-CM

## 2025-06-27 DIAGNOSIS — E78.2 MIXED HYPERLIPIDEMIA: ICD-10-CM

## 2025-06-27 DIAGNOSIS — G47.33 OBSTRUCTIVE SLEEP APNEA: ICD-10-CM

## 2025-06-27 DIAGNOSIS — C64.1 MALIGNANT NEOPLASM OF RIGHT KIDNEY (MULTI): ICD-10-CM

## 2025-06-27 DIAGNOSIS — I10 ESSENTIAL HYPERTENSION: ICD-10-CM

## 2025-06-27 DIAGNOSIS — R93.89 ABNORMAL CT SCAN: ICD-10-CM

## 2025-06-27 DIAGNOSIS — C64.9 MALIGNANT NEOPLASM OF KIDNEY, UNSPECIFIED LATERALITY (MULTI): ICD-10-CM

## 2025-06-27 DIAGNOSIS — I12.9 HYPERTENSIVE KIDNEY DISEASE WITH STAGE 3A CHRONIC KIDNEY DISEASE (MULTI): Primary | ICD-10-CM

## 2025-06-27 DIAGNOSIS — M16.11 PRIMARY OSTEOARTHRITIS OF RIGHT HIP: ICD-10-CM

## 2025-06-27 PROBLEM — E66.813 OBESITY, CLASS III, BMI 40-49.9 (MORBID OBESITY): Status: RESOLVED | Noted: 2021-05-04 | Resolved: 2025-06-27

## 2025-06-27 PROCEDURE — 3008F BODY MASS INDEX DOCD: CPT | Performed by: CLINICAL NURSE SPECIALIST

## 2025-06-27 PROCEDURE — 1159F MED LIST DOCD IN RCRD: CPT | Performed by: CLINICAL NURSE SPECIALIST

## 2025-06-27 PROCEDURE — 1036F TOBACCO NON-USER: CPT | Performed by: CLINICAL NURSE SPECIALIST

## 2025-06-27 PROCEDURE — 3078F DIAST BP <80 MM HG: CPT | Performed by: CLINICAL NURSE SPECIALIST

## 2025-06-27 PROCEDURE — 3074F SYST BP LT 130 MM HG: CPT | Performed by: CLINICAL NURSE SPECIALIST

## 2025-06-27 PROCEDURE — 99214 OFFICE O/P EST MOD 30 MIN: CPT | Performed by: CLINICAL NURSE SPECIALIST

## 2025-06-27 PROCEDURE — 1160F RVW MEDS BY RX/DR IN RCRD: CPT | Performed by: CLINICAL NURSE SPECIALIST

## 2025-06-27 ASSESSMENT — ENCOUNTER SYMPTOMS
SLEEP DISTURBANCE: 0
BRUISES/BLEEDS EASILY: 0
EYE PAIN: 0
CHILLS: 0
PALPITATIONS: 0
POLYDIPSIA: 0
CONSTIPATION: 0
DIARRHEA: 0
NECK PAIN: 0
DIZZINESS: 0
SORE THROAT: 0
WOUND: 0
FEVER: 0
SHORTNESS OF BREATH: 0
VOMITING: 0
CONFUSION: 0
WHEEZING: 0
APPETITE CHANGE: 0
ABDOMINAL PAIN: 0
BACK PAIN: 0
PHOTOPHOBIA: 0
ARTHRALGIAS: 0
CHEST TIGHTNESS: 0
DEPRESSION: 0
NAUSEA: 0
JOINT SWELLING: 0
LOSS OF SENSATION IN FEET: 0
HEMATURIA: 0
ACTIVITY CHANGE: 0
HEADACHES: 0
OCCASIONAL FEELINGS OF UNSTEADINESS: 0
SEIZURES: 0
UNEXPECTED WEIGHT CHANGE: 0
COUGH: 0
BLOOD IN STOOL: 0
TROUBLE SWALLOWING: 0
DYSURIA: 0
FATIGUE: 0
MYALGIAS: 0
FLANK PAIN: 0

## 2025-06-27 ASSESSMENT — COLUMBIA-SUICIDE SEVERITY RATING SCALE - C-SSRS
1. IN THE PAST MONTH, HAVE YOU WISHED YOU WERE DEAD OR WISHED YOU COULD GO TO SLEEP AND NOT WAKE UP?: NO
6. HAVE YOU EVER DONE ANYTHING, STARTED TO DO ANYTHING, OR PREPARED TO DO ANYTHING TO END YOUR LIFE?: NO
2. HAVE YOU ACTUALLY HAD ANY THOUGHTS OF KILLING YOURSELF?: NO

## 2025-06-27 NOTE — H&P (VIEW-ONLY)
Subjective   Patient ID: Ana Bustillos is a 64 y.o. female who presents for Follow-up (Follow up/) and Pre-op Exam (Pre op exam/).  HPI    Here today as a follow up appointment. Preoperative Clearance.     Scheduled for right total hip arthroplasty under general/ spinal anesthesia per Dr. Bliss on 7/7/25. Denies any recent infections/fevers/chills. Followed with Preadmission Testing.      Follows with [facility]. Nephrectomy. Most recent visit was told that she can be seen annually, seen November 2024.      Overall feels well. Tolerating current medications.      Has been continuing to work on weight loss since last OV. Feels well.      Using PAP therapy. Has been beneficial on current therapy settings. Feels well rested. [facility].     Review of Systems   Constitutional:  Negative for activity change, appetite change, chills, fatigue, fever and unexpected weight change.   HENT:  Negative for ear pain, hearing loss, nosebleeds, sore throat, tinnitus and trouble swallowing.    Eyes:  Negative for photophobia, pain and visual disturbance.   Respiratory:  Negative for cough, chest tightness, shortness of breath and wheezing.    Cardiovascular:  Negative for chest pain, palpitations and leg swelling.   Gastrointestinal:  Negative for abdominal pain, blood in stool, constipation, diarrhea, nausea and vomiting.   Endocrine: Negative for cold intolerance, heat intolerance, polydipsia and polyuria.   Genitourinary:  Negative for dysuria, flank pain and hematuria.   Musculoskeletal:  Negative for arthralgias, back pain, joint swelling, myalgias and neck pain.   Skin:  Negative for pallor, rash and wound.   Allergic/Immunologic: Negative for immunocompromised state.   Neurological:  Negative for dizziness, seizures and headaches.   Hematological:  Does not bruise/bleed easily.   Psychiatric/Behavioral:  Negative for confusion and sleep disturbance.        Objective   Physical Exam  Vitals and nursing  note reviewed.   Constitutional:       General: She is not in acute distress.     Appearance: Normal appearance.   HENT:      Head: Normocephalic.      Nose: Nose normal.   Eyes:      Conjunctiva/sclera: Conjunctivae normal.   Neck:      Vascular: No carotid bruit.   Cardiovascular:      Rate and Rhythm: Normal rate and regular rhythm.      Pulses: Normal pulses.      Heart sounds: Normal heart sounds.   Pulmonary:      Effort: Pulmonary effort is normal.      Breath sounds: Normal breath sounds.   Abdominal:      General: Bowel sounds are normal.      Palpations: Abdomen is soft.   Musculoskeletal:         General: Normal range of motion.      Cervical back: Normal range of motion.   Skin:     General: Skin is warm and dry.   Neurological:      Mental Status: She is alert and oriented to person, place, and time. Mental status is at baseline.   Psychiatric:         Mood and Affect: Mood normal.         Behavior: Behavior normal.       Assessment/Plan        Reviewed most recent records available.       Hypertension, PVC: Blood pressure controlled at OV today. Continue Lisinopril and Atenolol.   Hyperlipidemia: Continue Pravastatin. CT Cardiac Scoring: October 2023, moderate risk.   Vitamin D Deficiency: Continue Vitamin D. Reevaluate with next lab work.   GERD: Continue Prilosec PRN and Famotidine.   Prediabetes: A1C 5.9%. Lifestyle changes as noted below.   Obesity: BMI: 33.41. Lifestyle changes recommended: Diet consisting of low fat foods, lean meats, high fiber, fresh fruits and vegetables. 150 min/ weekly aerobic exercise.   Malignant neoplasm of Kidney: Following with Nephrology.   Osteopenia: Continue Fosamax as prescribed.  Sleep Apnea: Continue CPAP and supplies as needed as beneficial therapy.   OA, Preoperative Clearance: Cleared for Surgery with Dr. Bliss.       Bone Density: December 2024, Osteopenia.   Mammogram: December 2024.   Colonoscopy: June 2025, plan to repeat in 5 years.   Tdap: July 2021.    Flu Vaccine: September 2024.   COVID Vaccine: September 2024.    Shingrix: June 2023. January 2024.   RSV Vaccine: January 2024.   Prevnar 20: January 2024.   Medicare Wellness: January 2025.     Maddi Ramírez, LUKE-CNS 06/27/25 7:32 AM

## 2025-06-27 NOTE — PROGRESS NOTES
Subjective   Patient ID: Ana Bustillos is a 64 y.o. female who presents for Follow-up (Follow up/) and Pre-op Exam (Pre op exam/).  HPI    Here today as a follow up appointment. Preoperative Clearance.     Scheduled for right total hip arthroplasty under general/ spinal anesthesia per Dr. Bliss on 7/7/25. Denies any recent infections/fevers/chills. Followed with Preadmission Testing.      Follows with [facility] for Nephrology. Nephrectomy. Most recent visit was told that she can be seen annually, seen November 2024.      Overall feels well. Tolerating current medications.      Has been continuing to work on weight loss since last OV. Feels well.      Using PAP therapy. Has been beneficial on current therapy settings. Feels well rested. Girard[facility].     Review of Systems   Constitutional:  Negative for activity change, appetite change, chills, fatigue, fever and unexpected weight change.   HENT:  Negative for ear pain, hearing loss, nosebleeds, sore throat, tinnitus and trouble swallowing.    Eyes:  Negative for photophobia, pain and visual disturbance.   Respiratory:  Negative for cough, chest tightness, shortness of breath and wheezing.    Cardiovascular:  Negative for chest pain, palpitations and leg swelling.   Gastrointestinal:  Negative for abdominal pain, blood in stool, constipation, diarrhea, nausea and vomiting.   Endocrine: Negative for cold intolerance, heat intolerance, polydipsia and polyuria.   Genitourinary:  Negative for dysuria, flank pain and hematuria.   Musculoskeletal:  Negative for arthralgias, back pain, joint swelling, myalgias and neck pain.   Skin:  Negative for pallor, rash and wound.   Allergic/Immunologic: Negative for immunocompromised state.   Neurological:  Negative for dizziness, seizures and headaches.   Hematological:  Does not bruise/bleed easily.   Psychiatric/Behavioral:  Negative for confusion and sleep disturbance.        Objective   Physical Exam  Vitals and nursing  note reviewed.   Constitutional:       General: She is not in acute distress.     Appearance: Normal appearance.   HENT:      Head: Normocephalic.      Nose: Nose normal.   Eyes:      Conjunctiva/sclera: Conjunctivae normal.   Neck:      Vascular: No carotid bruit.   Cardiovascular:      Rate and Rhythm: Normal rate and regular rhythm.      Pulses: Normal pulses.      Heart sounds: Normal heart sounds.   Pulmonary:      Effort: Pulmonary effort is normal.      Breath sounds: Normal breath sounds.   Abdominal:      General: Bowel sounds are normal.      Palpations: Abdomen is soft.   Musculoskeletal:         General: Normal range of motion.      Cervical back: Normal range of motion.   Skin:     General: Skin is warm and dry.   Neurological:      Mental Status: She is alert and oriented to person, place, and time. Mental status is at baseline.   Psychiatric:         Mood and Affect: Mood normal.         Behavior: Behavior normal.       Assessment/Plan        Reviewed most recent records available.       Hypertension, PVC: Blood pressure controlled at OV today. Continue Lisinopril and Atenolol.   Hyperlipidemia: Continue Pravastatin. CT Cardiac Scoring: October 2023, moderate risk.   Vitamin D Deficiency: Continue Vitamin D. Reevaluate with next lab work.   GERD: Continue Prilosec PRN and Famotidine.   Prediabetes: A1C 5.9%. Lifestyle changes as noted below.   Obesity: BMI: 33.41. Lifestyle changes recommended: Diet consisting of low fat foods, lean meats, high fiber, fresh fruits and vegetables. 150 min/ weekly aerobic exercise.   Malignant neoplasm of Kidney: Following with Nephrology.   Osteopenia: Continue Fosamax as prescribed.  Sleep Apnea: Continue CPAP and supplies as needed as beneficial therapy.   OA, Preoperative Clearance: Cleared for Surgery with Dr. Bliss.       Bone Density: December 2024, Osteopenia.   Mammogram: December 2024.   Colonoscopy: June 2025, plan to repeat in 5 years.   Tdap: July 2021.    Flu Vaccine: September 2024.   COVID Vaccine: September 2024.    Shingrix: June 2023. January 2024.   RSV Vaccine: January 2024.   Prevnar 20: January 2024.   Medicare Wellness: January 2025.     Maddi Ramírez, LUKE-CNS 06/27/25 7:32 AM

## 2025-06-28 ENCOUNTER — PATIENT MESSAGE (OUTPATIENT)
Dept: PRIMARY CARE | Facility: CLINIC | Age: 65
End: 2025-06-28
Payer: MEDICARE

## 2025-06-28 DIAGNOSIS — K21.9 GASTROESOPHAGEAL REFLUX DISEASE WITHOUT ESOPHAGITIS: Primary | ICD-10-CM

## 2025-06-29 ENCOUNTER — HOSPITAL ENCOUNTER (EMERGENCY)
Facility: HOSPITAL | Age: 65
Discharge: HOME | End: 2025-06-29
Attending: EMERGENCY MEDICINE
Payer: MEDICARE

## 2025-06-29 ENCOUNTER — APPOINTMENT (OUTPATIENT)
Dept: RADIOLOGY | Facility: HOSPITAL | Age: 65
End: 2025-06-29
Payer: MEDICARE

## 2025-06-29 VITALS
HEIGHT: 67 IN | WEIGHT: 207 LBS | TEMPERATURE: 98.4 F | BODY MASS INDEX: 32.49 KG/M2 | OXYGEN SATURATION: 96 % | DIASTOLIC BLOOD PRESSURE: 73 MMHG | SYSTOLIC BLOOD PRESSURE: 119 MMHG | RESPIRATION RATE: 12 BRPM | HEART RATE: 73 BPM

## 2025-06-29 DIAGNOSIS — S09.90XA INJURY OF HEAD, INITIAL ENCOUNTER: Primary | ICD-10-CM

## 2025-06-29 DIAGNOSIS — F10.920 ALCOHOLIC INTOXICATION WITHOUT COMPLICATION: ICD-10-CM

## 2025-06-29 LAB
ALBUMIN SERPL BCP-MCNC: 4 G/DL (ref 3.4–5)
ALP SERPL-CCNC: 34 U/L (ref 33–136)
ALT SERPL W P-5'-P-CCNC: 8 U/L (ref 7–45)
ANION GAP SERPL CALC-SCNC: 12 MMOL/L (ref 10–20)
AST SERPL W P-5'-P-CCNC: 13 U/L (ref 9–39)
BASOPHILS # BLD AUTO: 0.08 X10*3/UL (ref 0–0.1)
BASOPHILS NFR BLD AUTO: 0.9 %
BILIRUB SERPL-MCNC: 0.5 MG/DL (ref 0–1.2)
BUN SERPL-MCNC: 23 MG/DL (ref 6–23)
CALCIUM SERPL-MCNC: 9.1 MG/DL (ref 8.6–10.3)
CHLORIDE SERPL-SCNC: 105 MMOL/L (ref 98–107)
CO2 SERPL-SCNC: 21 MMOL/L (ref 21–32)
CREAT SERPL-MCNC: 0.9 MG/DL (ref 0.5–1.05)
EGFRCR SERPLBLD CKD-EPI 2021: 71 ML/MIN/1.73M*2
EOSINOPHIL # BLD AUTO: 0.57 X10*3/UL (ref 0–0.7)
EOSINOPHIL NFR BLD AUTO: 6.2 %
ERYTHROCYTE [DISTWIDTH] IN BLOOD BY AUTOMATED COUNT: 13.5 % (ref 11.5–14.5)
ETHANOL SERPL-MCNC: 158 MG/DL
GLUCOSE SERPL-MCNC: 84 MG/DL (ref 74–99)
HCT VFR BLD AUTO: 38.7 % (ref 36–46)
HGB BLD-MCNC: 13.4 G/DL (ref 12–16)
IMM GRANULOCYTES # BLD AUTO: 0.07 X10*3/UL (ref 0–0.7)
IMM GRANULOCYTES NFR BLD AUTO: 0.8 % (ref 0–0.9)
INR PPP: 1 (ref 0.9–1.1)
LYMPHOCYTES # BLD AUTO: 3.46 X10*3/UL (ref 1.2–4.8)
LYMPHOCYTES NFR BLD AUTO: 37.8 %
MCH RBC QN AUTO: 30.9 PG (ref 26–34)
MCHC RBC AUTO-ENTMCNC: 34.6 G/DL (ref 32–36)
MCV RBC AUTO: 89 FL (ref 80–100)
MONOCYTES # BLD AUTO: 0.69 X10*3/UL (ref 0.1–1)
MONOCYTES NFR BLD AUTO: 7.5 %
NEUTROPHILS # BLD AUTO: 4.28 X10*3/UL (ref 1.2–7.7)
NEUTROPHILS NFR BLD AUTO: 46.8 %
NRBC BLD-RTO: 0 /100 WBCS (ref 0–0)
PLATELET # BLD AUTO: 238 X10*3/UL (ref 150–450)
POTASSIUM SERPL-SCNC: 3.8 MMOL/L (ref 3.5–5.3)
PROT SERPL-MCNC: 6.9 G/DL (ref 6.4–8.2)
PROTHROMBIN TIME: 11.2 SECONDS (ref 9.8–12.4)
RBC # BLD AUTO: 4.33 X10*6/UL (ref 4–5.2)
SODIUM SERPL-SCNC: 134 MMOL/L (ref 136–145)
WBC # BLD AUTO: 9.2 X10*3/UL (ref 4.4–11.3)

## 2025-06-29 PROCEDURE — 80053 COMPREHEN METABOLIC PANEL: CPT | Performed by: EMERGENCY MEDICINE

## 2025-06-29 PROCEDURE — 70450 CT HEAD/BRAIN W/O DYE: CPT | Performed by: SURGERY

## 2025-06-29 PROCEDURE — 36415 COLL VENOUS BLD VENIPUNCTURE: CPT | Performed by: EMERGENCY MEDICINE

## 2025-06-29 PROCEDURE — 72125 CT NECK SPINE W/O DYE: CPT

## 2025-06-29 PROCEDURE — 99284 EMERGENCY DEPT VISIT MOD MDM: CPT | Mod: 25 | Performed by: EMERGENCY MEDICINE

## 2025-06-29 PROCEDURE — 85025 COMPLETE CBC W/AUTO DIFF WBC: CPT | Performed by: EMERGENCY MEDICINE

## 2025-06-29 PROCEDURE — 82077 ASSAY SPEC XCP UR&BREATH IA: CPT | Performed by: EMERGENCY MEDICINE

## 2025-06-29 PROCEDURE — 85610 PROTHROMBIN TIME: CPT | Performed by: EMERGENCY MEDICINE

## 2025-06-29 PROCEDURE — G0390 TRAUMA RESPONS W/HOSP CRITI: HCPCS

## 2025-06-29 PROCEDURE — 72125 CT NECK SPINE W/O DYE: CPT | Performed by: SURGERY

## 2025-06-29 PROCEDURE — 70450 CT HEAD/BRAIN W/O DYE: CPT

## 2025-06-29 RX ORDER — FAMOTIDINE 20 MG/1
20 TABLET, FILM COATED ORAL
Qty: 90 TABLET | Refills: 3 | Status: SHIPPED | OUTPATIENT
Start: 2025-06-29 | End: 2026-06-29

## 2025-06-29 ASSESSMENT — LIFESTYLE VARIABLES
HAVE PEOPLE ANNOYED YOU BY CRITICIZING YOUR DRINKING: NO
HAVE YOU EVER FELT YOU SHOULD CUT DOWN ON YOUR DRINKING: NO
EVER FELT BAD OR GUILTY ABOUT YOUR DRINKING: NO
TOTAL SCORE: 0
EVER HAD A DRINK FIRST THING IN THE MORNING TO STEADY YOUR NERVES TO GET RID OF A HANGOVER: NO

## 2025-06-29 ASSESSMENT — PAIN SCALES - GENERAL: PAINLEVEL_OUTOF10: 0 - NO PAIN

## 2025-06-29 ASSESSMENT — PAIN - FUNCTIONAL ASSESSMENT: PAIN_FUNCTIONAL_ASSESSMENT: 0-10

## 2025-06-29 ASSESSMENT — PAIN DESCRIPTION - PAIN TYPE: TYPE: ACUTE PAIN

## 2025-06-29 NOTE — ED PROVIDER NOTES
HPI   Chief Complaint   Patient presents with    fall of pool ladder, +LOC, limited trauma       Presents emergency department after a fall.  Patient fell approximately 3 and half to 4 feet off of a ladder she was getting out of a swimming pool.  She landed on her left side.  She did have loss of consciousness.  Initially for paramedics was not talking.  During transport became awake and alert x 2.  Patient currently is mildly confused but able to answer most questions correctly.  Patient does report drinking margaritas while swimming.  She thinks she had about 2.  She has no pain complaints.  She denies head or neck pain.  No chest pain or shortness of breath.  No abdominal pain.  No nausea or vomiting.  No extremity pain.  She is not on blood thinning medications.  Patient was made a limited trauma secondary to fall with head injury and loss of consciousness.                          Nichol Coma Scale Score: 14                  Patient History   Medical History[1]  Surgical History[2]  Family History[3]  Social History[4]    Physical Exam   ED Triage Vitals   Temp Pulse Resp BP   -- -- -- --      SpO2 Temp src Heart Rate Source Patient Position   -- -- -- --      BP Location FiO2 (%)     -- --       Physical Exam  Vitals and nursing note reviewed.   Constitutional:       Appearance: Normal appearance. She is normal weight. She is not ill-appearing.   HENT:      Head: Normocephalic and atraumatic.      Right Ear: Tympanic membrane normal.      Left Ear: Tympanic membrane normal.      Nose: Nose normal.      Mouth/Throat:      Mouth: Mucous membranes are moist.   Eyes:      Extraocular Movements: Extraocular movements intact.      Pupils: Pupils are equal, round, and reactive to light.   Neck:      Comments: No C-spine tenderness.  C-collar is in place by paramedics.  Cardiovascular:      Rate and Rhythm: Normal rate and regular rhythm.      Pulses: Normal pulses.      Heart sounds: Normal heart sounds.    Pulmonary:      Effort: Pulmonary effort is normal.      Breath sounds: Normal breath sounds.   Abdominal:      General: Abdomen is flat. Bowel sounds are normal.      Palpations: Abdomen is soft.      Tenderness: There is no abdominal tenderness. There is no guarding or rebound.   Musculoskeletal:         General: No tenderness or deformity. Normal range of motion.   Skin:     General: Skin is warm and dry.      Capillary Refill: Capillary refill takes less than 2 seconds.   Neurological:      General: No focal deficit present.      Mental Status: She is alert. She is disoriented.      Comments: Patient is awake and alert to person and place.  Initially when I asked her the year she tried to give me the month.  She then was able to correct herself to the year.  She was asked by registration the last 4 digits of her Social Security number.  She initially gave the ZIP Code but then was able to give her Social Security number after redirection.   Psychiatric:         Mood and Affect: Mood normal.         Behavior: Behavior normal.       Labs Reviewed   COMPREHENSIVE METABOLIC PANEL - Abnormal       Result Value    Glucose 84      Sodium 134 (*)     Potassium 3.8      Chloride 105      Bicarbonate 21      Anion Gap 12      Urea Nitrogen 23      Creatinine 0.90      eGFR 71      Calcium 9.1      Albumin 4.0      Alkaline Phosphatase 34      Total Protein 6.9      AST 13      Bilirubin, Total 0.5      ALT 8     ALCOHOL - Abnormal    Alcohol 158 (*)    PROTIME-INR - Normal    Protime 11.2      INR 1.0     CBC WITH AUTO DIFFERENTIAL    WBC 9.2      nRBC 0.0      RBC 4.33      Hemoglobin 13.4      Hematocrit 38.7      MCV 89      MCH 30.9      MCHC 34.6      RDW 13.5      Platelets 238      Neutrophils % 46.8      Immature Granulocytes %, Automated 0.8      Lymphocytes % 37.8      Monocytes % 7.5      Eosinophils % 6.2      Basophils % 0.9      Neutrophils Absolute 4.28      Immature Granulocytes Absolute, Automated 0.07       Lymphocytes Absolute 3.46      Monocytes Absolute 0.69      Eosinophils Absolute 0.57      Basophils Absolute 0.08       Pain Management Panel           No data to display              CT cervical spine wo IV contrast   Final Result   No evidence of acute intracranial abnormality.        No acute cervical spine fracture or malalignment.             MACRO:   None        Signed by: Jorge Cooper 6/29/2025 7:25 PM   Dictation workstation:   [id number]      CT head wo IV contrast   Final Result   No evidence of acute intracranial abnormality.        No acute cervical spine fracture or malalignment.             MACRO:   None        Signed by: Jorge Cooper 6/29/2025 7:25 PM   Dictation workstation:   [id number]        ED Course & MDM   Diagnoses as of 06/29/25 2006   Injury of head, initial encounter   Alcoholic intoxication without complication       Medical Decision Making  Patient presents to the emergency department secondary to head injury and fall.  Patient is evaluated in the emergency department CT head and cervical spine.  Patient CT head and cervical spine are negative for acute traumatic process.  Patient's alcohol level was found to be 158.  No other acute findings on lab work.  Patient is observed in the emergency department for 2 hours.  Patient is reevaluated for clearance of her cervical spine.  Patient has no pain at this time.  Patient is medically cleared for discharge.  Patient was discussed with Dr. Souza from trauma services prior to discharge.        Procedure  Procedures         [1]   Past Medical History:  Diagnosis Date    Cataract 2019    GERD (gastroesophageal reflux disease)     Hypertension     Personal history of COVID-19 01/25/2022    History of COVID-19    Personal history of other diseases of urinary system     History of hematuria    Personal history of other malignant neoplasm of kidney     History of renal carcinoma    Personal history of other malignant neoplasm of skin      History of other malignant neoplasm of skin    Renal cancer (Multi)     right    Sleep apnea     Urinary tract infection, site not specified 03/16/2021    Acute lower UTI   [2]   Past Surgical History:  Procedure Laterality Date    CATARACT EXTRACTION      COLONOSCOPY      OTHER SURGICAL HISTORY  01/16/2020    Excision of basal cell carcinoma    OTHER SURGICAL HISTORY  07/27/2021    Kidney surgery    OTHER SURGICAL HISTORY  07/27/2021    Nephrectomy   [3]   Family History  Problem Relation Name Age of Onset    Cancer Brother London Saavedrakman     Hypertension Brother Yunior Radha     Diabetes Brother Yunior Radha     Hypertension Brother Marcial Garcia     Cancer Brother London Garcia     Diabetes Brother Yunior Garcia     Hypertension Brother Yunior Radha     Hypertension Brother Marcial Saavedrakman     Cancer Brother London Saavedrakman     Hypertension Brother Yunior Saavedrakman     Hypertension Brother Marcial Garcia     Cancer Brother London Saavedrakman     Hypertension Brother Yunior Garcia     Hypertension Brother Marcial Garcia    [4]   Social History  Tobacco Use    Smoking status: Never    Smokeless tobacco: Never   Vaping Use    Vaping status: Never Used   Substance Use Topics    Alcohol use: Not Currently     Alcohol/week: 2.0 standard drinks of alcohol     Types: 1 Glasses of wine, 1 Standard drinks or equivalent per week    Drug use: Never        Petra Peng MD  06/29/25 2051

## 2025-06-30 ENCOUNTER — PATIENT MESSAGE (OUTPATIENT)
Dept: PRIMARY CARE | Facility: CLINIC | Age: 65
End: 2025-06-30
Payer: MEDICARE

## 2025-07-01 ENCOUNTER — APPOINTMENT (OUTPATIENT)
Dept: PRIMARY CARE | Facility: CLINIC | Age: 65
End: 2025-07-01
Payer: MEDICARE

## 2025-07-02 DIAGNOSIS — I10 BENIGN HYPERTENSION: ICD-10-CM

## 2025-07-02 RX ORDER — LISINOPRIL 20 MG/1
20 TABLET ORAL DAILY
Qty: 90 TABLET | Refills: 1 | Status: SHIPPED | OUTPATIENT
Start: 2025-07-02

## 2025-07-03 ENCOUNTER — APPOINTMENT (OUTPATIENT)
Dept: PRIMARY CARE | Facility: CLINIC | Age: 65
End: 2025-07-03
Payer: MEDICARE

## 2025-07-07 ENCOUNTER — APPOINTMENT (OUTPATIENT)
Dept: CARDIOLOGY | Facility: HOSPITAL | Age: 65
End: 2025-07-07
Payer: MEDICARE

## 2025-07-07 ENCOUNTER — ANESTHESIA (OUTPATIENT)
Dept: OPERATING ROOM | Facility: HOSPITAL | Age: 65
End: 2025-07-07
Payer: MEDICARE

## 2025-07-07 ENCOUNTER — HOSPITAL ENCOUNTER (OUTPATIENT)
Facility: HOSPITAL | Age: 65
Discharge: HOME | End: 2025-07-08
Attending: SPECIALIST | Admitting: SPECIALIST
Payer: MEDICARE

## 2025-07-07 ENCOUNTER — APPOINTMENT (OUTPATIENT)
Dept: RADIOLOGY | Facility: HOSPITAL | Age: 65
End: 2025-07-07
Payer: MEDICARE

## 2025-07-07 DIAGNOSIS — Z96.641 STATUS POST TOTAL HIP REPLACEMENT, RIGHT: ICD-10-CM

## 2025-07-07 DIAGNOSIS — M16.11 PRIMARY OSTEOARTHRITIS OF RIGHT HIP: Primary | ICD-10-CM

## 2025-07-07 LAB
ABO GROUP (TYPE) IN BLOOD: NORMAL
ATRIAL RATE: 68 BPM
P AXIS: 48 DEGREES
PR INTERVAL: 208 MS
Q ONSET: 252 MS
QRS COUNT: 11 BEATS
QRS DURATION: 98 MS
QT INTERVAL: 388 MS
QTC CALCULATION(BAZETT): 416 MS
QTC FREDERICIA: 406 MS
R AXIS: -2 DEGREES
RH FACTOR (ANTIGEN D): NORMAL
T AXIS: 33 DEGREES
T OFFSET: 446 MS
VENTRICULAR RATE: 69 BPM

## 2025-07-07 PROCEDURE — 2500000004 HC RX 250 GENERAL PHARMACY W/ HCPCS (ALT 636 FOR OP/ED): Performed by: SPECIALIST

## 2025-07-07 PROCEDURE — C1776 JOINT DEVICE (IMPLANTABLE): HCPCS | Performed by: SPECIALIST

## 2025-07-07 PROCEDURE — 7100000001 HC RECOVERY ROOM TIME - INITIAL BASE CHARGE: Performed by: SPECIALIST

## 2025-07-07 PROCEDURE — 2500000005 HC RX 250 GENERAL PHARMACY W/O HCPCS: Performed by: REGISTERED NURSE

## 2025-07-07 PROCEDURE — 3700000002 HC GENERAL ANESTHESIA TIME - EACH INCREMENTAL 1 MINUTE: Performed by: SPECIALIST

## 2025-07-07 PROCEDURE — 97530 THERAPEUTIC ACTIVITIES: CPT | Mod: GP

## 2025-07-07 PROCEDURE — 3600000018 HC OR TIME - INITIAL BASE CHARGE - PROCEDURE LEVEL SIX: Performed by: SPECIALIST

## 2025-07-07 PROCEDURE — 2500000004 HC RX 250 GENERAL PHARMACY W/ HCPCS (ALT 636 FOR OP/ED)

## 2025-07-07 PROCEDURE — 93010 ELECTROCARDIOGRAM REPORT: CPT | Performed by: INTERNAL MEDICINE

## 2025-07-07 PROCEDURE — 27130 TOTAL HIP ARTHROPLASTY: CPT | Performed by: SPECIALIST

## 2025-07-07 PROCEDURE — 27130 TOTAL HIP ARTHROPLASTY: CPT

## 2025-07-07 PROCEDURE — A6213 FOAM DRG >16<=48 SQ IN W/BDR: HCPCS | Performed by: SPECIALIST

## 2025-07-07 PROCEDURE — 3700000001 HC GENERAL ANESTHESIA TIME - INITIAL BASE CHARGE: Performed by: SPECIALIST

## 2025-07-07 PROCEDURE — 97162 PT EVAL MOD COMPLEX 30 MIN: CPT | Mod: GP

## 2025-07-07 PROCEDURE — C1713 ANCHOR/SCREW BN/BN,TIS/BN: HCPCS | Performed by: SPECIALIST

## 2025-07-07 PROCEDURE — 3600000017 HC OR TIME - EACH INCREMENTAL 1 MINUTE - PROCEDURE LEVEL SIX: Performed by: SPECIALIST

## 2025-07-07 PROCEDURE — 7100000011 HC EXTENDED STAY RECOVERY HOURLY - NURSING UNIT

## 2025-07-07 PROCEDURE — 2780000003 HC OR 278 NO HCPCS: Performed by: SPECIALIST

## 2025-07-07 PROCEDURE — 93005 ELECTROCARDIOGRAM TRACING: CPT

## 2025-07-07 PROCEDURE — 2720000007 HC OR 272 NO HCPCS: Performed by: SPECIALIST

## 2025-07-07 PROCEDURE — 2500000004 HC RX 250 GENERAL PHARMACY W/ HCPCS (ALT 636 FOR OP/ED): Performed by: ANESTHESIOLOGY

## 2025-07-07 PROCEDURE — 72170 X-RAY EXAM OF PELVIS: CPT | Performed by: RADIOLOGY

## 2025-07-07 PROCEDURE — 2500000001 HC RX 250 WO HCPCS SELF ADMINISTERED DRUGS (ALT 637 FOR MEDICARE OP): Performed by: ANESTHESIOLOGY

## 2025-07-07 PROCEDURE — 2500000005 HC RX 250 GENERAL PHARMACY W/O HCPCS: Performed by: SPECIALIST

## 2025-07-07 PROCEDURE — 7100000002 HC RECOVERY ROOM TIME - EACH INCREMENTAL 1 MINUTE: Performed by: SPECIALIST

## 2025-07-07 PROCEDURE — 2500000001 HC RX 250 WO HCPCS SELF ADMINISTERED DRUGS (ALT 637 FOR MEDICARE OP)

## 2025-07-07 PROCEDURE — 36415 COLL VENOUS BLD VENIPUNCTURE: CPT | Performed by: SPECIALIST

## 2025-07-07 PROCEDURE — 72170 X-RAY EXAM OF PELVIS: CPT

## 2025-07-07 PROCEDURE — 2500000004 HC RX 250 GENERAL PHARMACY W/ HCPCS (ALT 636 FOR OP/ED): Performed by: REGISTERED NURSE

## 2025-07-07 DEVICE — TRIDENT X3 10 DEGREE POLYETHYLENE INSERT
Type: IMPLANTABLE DEVICE | Site: HIP | Status: FUNCTIONAL
Brand: TRIDENT X3 INSERT

## 2025-07-07 DEVICE — 132 DEGREE NECK ANGLE HIP STEM
Type: IMPLANTABLE DEVICE | Site: HIP | Status: FUNCTIONAL
Brand: ACCOLADE

## 2025-07-07 DEVICE — CERAMIC V40 FEMORAL HEAD
Type: IMPLANTABLE DEVICE | Site: HIP | Status: FUNCTIONAL
Brand: BIOLOX

## 2025-07-07 DEVICE — TRIDENT II TRITANIUM CLUSTER 52E
Type: IMPLANTABLE DEVICE | Site: HIP | Status: FUNCTIONAL
Brand: TRIDENT II

## 2025-07-07 DEVICE — 6.5MM LOW PROFILE HEX SCREW 25MM
Type: IMPLANTABLE DEVICE | Site: HIP | Status: FUNCTIONAL
Brand: TRIDENT II

## 2025-07-07 RX ORDER — PRAVASTATIN SODIUM 40 MG/1
40 TABLET ORAL DAILY
Status: DISCONTINUED | OUTPATIENT
Start: 2025-07-07 | End: 2025-07-08 | Stop reason: HOSPADM

## 2025-07-07 RX ORDER — NALOXONE HYDROCHLORIDE 0.4 MG/ML
0.2 INJECTION, SOLUTION INTRAMUSCULAR; INTRAVENOUS; SUBCUTANEOUS EVERY 5 MIN PRN
Status: DISCONTINUED | OUTPATIENT
Start: 2025-07-07 | End: 2025-07-08 | Stop reason: HOSPADM

## 2025-07-07 RX ORDER — ATENOLOL 25 MG/1
25 TABLET ORAL DAILY
Status: DISCONTINUED | OUTPATIENT
Start: 2025-07-07 | End: 2025-07-08 | Stop reason: HOSPADM

## 2025-07-07 RX ORDER — ONDANSETRON HYDROCHLORIDE 2 MG/ML
4 INJECTION, SOLUTION INTRAVENOUS EVERY 8 HOURS PRN
Status: DISCONTINUED | OUTPATIENT
Start: 2025-07-07 | End: 2025-07-08 | Stop reason: HOSPADM

## 2025-07-07 RX ORDER — METOCLOPRAMIDE HYDROCHLORIDE 5 MG/ML
10 INJECTION INTRAMUSCULAR; INTRAVENOUS ONCE
Status: COMPLETED | OUTPATIENT
Start: 2025-07-07 | End: 2025-07-07

## 2025-07-07 RX ORDER — LIDOCAINE HYDROCHLORIDE 20 MG/ML
INJECTION, SOLUTION INFILTRATION; PERINEURAL AS NEEDED
Status: DISCONTINUED | OUTPATIENT
Start: 2025-07-07 | End: 2025-07-07

## 2025-07-07 RX ORDER — LISINOPRIL 20 MG/1
20 TABLET ORAL DAILY
Status: DISCONTINUED | OUTPATIENT
Start: 2025-07-07 | End: 2025-07-08 | Stop reason: HOSPADM

## 2025-07-07 RX ORDER — SODIUM CHLORIDE 0.9 G/100ML
INJECTION, SOLUTION IRRIGATION AS NEEDED
Status: DISCONTINUED | OUTPATIENT
Start: 2025-07-07 | End: 2025-07-07 | Stop reason: HOSPADM

## 2025-07-07 RX ORDER — HYDROCODONE BITARTRATE AND ACETAMINOPHEN 5; 325 MG/1; MG/1
1 TABLET ORAL EVERY 4 HOURS PRN
Status: DISCONTINUED | OUTPATIENT
Start: 2025-07-07 | End: 2025-07-08 | Stop reason: HOSPADM

## 2025-07-07 RX ORDER — HYDROMORPHONE HYDROCHLORIDE 0.2 MG/ML
0.2 INJECTION INTRAMUSCULAR; INTRAVENOUS; SUBCUTANEOUS EVERY 5 MIN PRN
Status: DISCONTINUED | OUTPATIENT
Start: 2025-07-07 | End: 2025-07-07 | Stop reason: HOSPADM

## 2025-07-07 RX ORDER — FAMOTIDINE 10 MG/ML
20 INJECTION, SOLUTION INTRAVENOUS ONCE
Status: COMPLETED | OUTPATIENT
Start: 2025-07-07 | End: 2025-07-07

## 2025-07-07 RX ORDER — PHENYLEPHRINE HCL IN 0.9% NACL 0.4MG/10ML
SYRINGE (ML) INTRAVENOUS AS NEEDED
Status: DISCONTINUED | OUTPATIENT
Start: 2025-07-07 | End: 2025-07-07

## 2025-07-07 RX ORDER — ONDANSETRON HYDROCHLORIDE 2 MG/ML
4 INJECTION, SOLUTION INTRAVENOUS ONCE
Status: COMPLETED | OUTPATIENT
Start: 2025-07-07 | End: 2025-07-07

## 2025-07-07 RX ORDER — PROPOFOL 10 MG/ML
INJECTION, EMULSION INTRAVENOUS AS NEEDED
Status: DISCONTINUED | OUTPATIENT
Start: 2025-07-07 | End: 2025-07-07

## 2025-07-07 RX ORDER — IPRATROPIUM BROMIDE AND ALBUTEROL SULFATE 2.5; .5 MG/3ML; MG/3ML
3 SOLUTION RESPIRATORY (INHALATION) ONCE
Status: DISCONTINUED | OUTPATIENT
Start: 2025-07-07 | End: 2025-07-07 | Stop reason: HOSPADM

## 2025-07-07 RX ORDER — MIDAZOLAM HYDROCHLORIDE 1 MG/ML
INJECTION, SOLUTION INTRAMUSCULAR; INTRAVENOUS AS NEEDED
Status: DISCONTINUED | OUTPATIENT
Start: 2025-07-07 | End: 2025-07-07

## 2025-07-07 RX ORDER — HYDROMORPHONE HYDROCHLORIDE 1 MG/ML
INJECTION, SOLUTION INTRAMUSCULAR; INTRAVENOUS; SUBCUTANEOUS AS NEEDED
Status: DISCONTINUED | OUTPATIENT
Start: 2025-07-07 | End: 2025-07-07

## 2025-07-07 RX ORDER — ONDANSETRON HYDROCHLORIDE 2 MG/ML
4 INJECTION, SOLUTION INTRAVENOUS ONCE AS NEEDED
Status: DISCONTINUED | OUTPATIENT
Start: 2025-07-07 | End: 2025-07-07 | Stop reason: HOSPADM

## 2025-07-07 RX ORDER — ONDANSETRON 4 MG/1
4 TABLET, ORALLY DISINTEGRATING ORAL EVERY 8 HOURS PRN
Status: DISCONTINUED | OUTPATIENT
Start: 2025-07-07 | End: 2025-07-08 | Stop reason: HOSPADM

## 2025-07-07 RX ORDER — ONDANSETRON HYDROCHLORIDE 2 MG/ML
INJECTION, SOLUTION INTRAVENOUS AS NEEDED
Status: DISCONTINUED | OUTPATIENT
Start: 2025-07-07 | End: 2025-07-07

## 2025-07-07 RX ORDER — ROPIVACAINE/EPI/CLONIDINE/KET 2.46-0.005
SYRINGE (ML) INJECTION AS NEEDED
Status: DISCONTINUED | OUTPATIENT
Start: 2025-07-07 | End: 2025-07-07 | Stop reason: HOSPADM

## 2025-07-07 RX ORDER — ROCURONIUM BROMIDE 10 MG/ML
INJECTION, SOLUTION INTRAVENOUS AS NEEDED
Status: DISCONTINUED | OUTPATIENT
Start: 2025-07-07 | End: 2025-07-07

## 2025-07-07 RX ORDER — HYDROCODONE BITARTRATE AND ACETAMINOPHEN 5; 325 MG/1; MG/1
2 TABLET ORAL EVERY 4 HOURS PRN
Status: DISCONTINUED | OUTPATIENT
Start: 2025-07-07 | End: 2025-07-08 | Stop reason: HOSPADM

## 2025-07-07 RX ORDER — METOCLOPRAMIDE 10 MG/1
10 TABLET ORAL EVERY 6 HOURS PRN
Status: DISCONTINUED | OUTPATIENT
Start: 2025-07-07 | End: 2025-07-08 | Stop reason: HOSPADM

## 2025-07-07 RX ORDER — SODIUM CITRATE AND CITRIC ACID MONOHYDRATE 334; 500 MG/5ML; MG/5ML
30 SOLUTION ORAL ONCE
Status: COMPLETED | OUTPATIENT
Start: 2025-07-07 | End: 2025-07-07

## 2025-07-07 RX ORDER — LIDOCAINE HYDROCHLORIDE AND EPINEPHRINE 10; 20 UG/ML; MG/ML
INJECTION, SOLUTION INFILTRATION; PERINEURAL AS NEEDED
Status: DISCONTINUED | OUTPATIENT
Start: 2025-07-07 | End: 2025-07-07

## 2025-07-07 RX ORDER — METOCLOPRAMIDE HYDROCHLORIDE 5 MG/ML
10 INJECTION INTRAMUSCULAR; INTRAVENOUS EVERY 6 HOURS PRN
Status: DISCONTINUED | OUTPATIENT
Start: 2025-07-07 | End: 2025-07-08 | Stop reason: HOSPADM

## 2025-07-07 RX ORDER — FENTANYL CITRATE 50 UG/ML
INJECTION, SOLUTION INTRAMUSCULAR; INTRAVENOUS AS NEEDED
Status: DISCONTINUED | OUTPATIENT
Start: 2025-07-07 | End: 2025-07-07

## 2025-07-07 RX ORDER — TRANEXAMIC ACID 1 G/10ML
INJECTION, SOLUTION INTRAVENOUS AS NEEDED
Status: DISCONTINUED | OUTPATIENT
Start: 2025-07-07 | End: 2025-07-07

## 2025-07-07 RX ORDER — ROPIVACAINE HYDROCHLORIDE 5 MG/ML
INJECTION, SOLUTION EPIDURAL; INFILTRATION; PERINEURAL AS NEEDED
Status: DISCONTINUED | OUTPATIENT
Start: 2025-07-07 | End: 2025-07-07

## 2025-07-07 RX ORDER — ACETAMINOPHEN 325 MG/1
650 TABLET ORAL EVERY 6 HOURS SCHEDULED
Status: DISCONTINUED | OUTPATIENT
Start: 2025-07-07 | End: 2025-07-08 | Stop reason: HOSPADM

## 2025-07-07 RX ORDER — VANCOMYCIN HYDROCHLORIDE 1 G/200ML
1000 INJECTION, SOLUTION INTRAVENOUS ONCE
Status: COMPLETED | OUTPATIENT
Start: 2025-07-07 | End: 2025-07-07

## 2025-07-07 RX ORDER — FAMOTIDINE 20 MG/1
20 TABLET, FILM COATED ORAL
Status: DISCONTINUED | OUTPATIENT
Start: 2025-07-07 | End: 2025-07-08 | Stop reason: HOSPADM

## 2025-07-07 RX ORDER — ASPIRIN 81 MG/1
81 TABLET ORAL 2 TIMES DAILY
Status: DISCONTINUED | OUTPATIENT
Start: 2025-07-07 | End: 2025-07-08 | Stop reason: HOSPADM

## 2025-07-07 RX ORDER — SODIUM CHLORIDE, SODIUM LACTATE, POTASSIUM CHLORIDE, CALCIUM CHLORIDE 600; 310; 30; 20 MG/100ML; MG/100ML; MG/100ML; MG/100ML
125 INJECTION, SOLUTION INTRAVENOUS CONTINUOUS
Status: ACTIVE | OUTPATIENT
Start: 2025-07-07 | End: 2025-07-08

## 2025-07-07 RX ADMIN — FAMOTIDINE 20 MG: 10 INJECTION, SOLUTION INTRAVENOUS at 06:52

## 2025-07-07 RX ADMIN — VANCOMYCIN HYDROCHLORIDE 1000 G: 1 INJECTION, POWDER, LYOPHILIZED, FOR SOLUTION INTRAVENOUS at 07:45

## 2025-07-07 RX ADMIN — MIDAZOLAM 2 MG: 1 INJECTION INTRAMUSCULAR; INTRAVENOUS at 07:10

## 2025-07-07 RX ADMIN — Medication 150 MCG: at 08:06

## 2025-07-07 RX ADMIN — POVIDONE-IODINE 1 APPLICATION: 5 SOLUTION TOPICAL at 06:53

## 2025-07-07 RX ADMIN — PROPOFOL 30 MG: 10 INJECTION, EMULSION INTRAVENOUS at 09:37

## 2025-07-07 RX ADMIN — ASPIRIN 81 MG: 81 TABLET, COATED ORAL at 21:24

## 2025-07-07 RX ADMIN — HYDROMORPHONE HYDROCHLORIDE 0.5 MG: 1 INJECTION INTRAMUSCULAR; INTRAVENOUS; SUBCUTANEOUS at 09:37

## 2025-07-07 RX ADMIN — METOCLOPRAMIDE 10 MG: 5 INJECTION, SOLUTION INTRAMUSCULAR; INTRAVENOUS at 06:52

## 2025-07-07 RX ADMIN — TRANEXAMIC ACID 1000 MG: 1 INJECTION, SOLUTION INTRAVENOUS at 08:55

## 2025-07-07 RX ADMIN — LIDOCAINE HYDROCHLORIDE,EPINEPHRINE BITARTRATE 5 ML: 20; .01 INJECTION, SOLUTION INFILTRATION; PERINEURAL at 07:14

## 2025-07-07 RX ADMIN — ONDANSETRON 4 MG: 2 INJECTION, SOLUTION INTRAMUSCULAR; INTRAVENOUS at 06:52

## 2025-07-07 RX ADMIN — VANCOMYCIN HYDROCHLORIDE 1000 MG: 1 INJECTION, SOLUTION INTRAVENOUS at 06:52

## 2025-07-07 RX ADMIN — FENTANYL CITRATE 50 MCG: 50 INJECTION INTRAMUSCULAR; INTRAVENOUS at 07:09

## 2025-07-07 RX ADMIN — ONDANSETRON 4 MG: 2 INJECTION, SOLUTION INTRAMUSCULAR; INTRAVENOUS at 09:17

## 2025-07-07 RX ADMIN — Medication 100 MCG: at 09:13

## 2025-07-07 RX ADMIN — LISINOPRIL 20 MG: 10 TABLET ORAL at 13:46

## 2025-07-07 RX ADMIN — ROPIVACAINE HYDROCHLORIDE 20 ML: 5 INJECTION, SOLUTION EPIDURAL; INFILTRATION; PERINEURAL at 07:15

## 2025-07-07 RX ADMIN — ACETAMINOPHEN 650 MG: 325 TABLET ORAL at 13:46

## 2025-07-07 RX ADMIN — ROCURONIUM BROMIDE 50 MG: 10 INJECTION, SOLUTION INTRAVENOUS at 07:51

## 2025-07-07 RX ADMIN — SUGAMMADEX 200 MG: 100 INJECTION, SOLUTION INTRAVENOUS at 09:34

## 2025-07-07 RX ADMIN — PROPOFOL 170 MG: 10 INJECTION, EMULSION INTRAVENOUS at 07:51

## 2025-07-07 RX ADMIN — Medication 100 MCG: at 08:32

## 2025-07-07 RX ADMIN — PRAVASTATIN SODIUM 40 MG: 40 TABLET ORAL at 21:00

## 2025-07-07 RX ADMIN — FAMOTIDINE 20 MG: 20 TABLET, FILM COATED ORAL at 13:46

## 2025-07-07 RX ADMIN — Medication 100 MCG: at 09:26

## 2025-07-07 RX ADMIN — DEXAMETHASONE SODIUM PHOSPHATE 8 MG: 4 INJECTION, SOLUTION INTRAMUSCULAR; INTRAVENOUS at 08:17

## 2025-07-07 RX ADMIN — ACETAMINOPHEN 650 MG: 325 TABLET ORAL at 19:33

## 2025-07-07 RX ADMIN — ATENOLOL 25 MG: 25 TABLET ORAL at 21:24

## 2025-07-07 RX ADMIN — TRANEXAMIC ACID 1000 MG: 1 INJECTION, SOLUTION INTRAVENOUS at 07:55

## 2025-07-07 RX ADMIN — Medication 100 MCG: at 08:10

## 2025-07-07 RX ADMIN — Medication 50 MCG: at 08:03

## 2025-07-07 RX ADMIN — LIDOCAINE HYDROCHLORIDE 100 MG: 20 INJECTION, SOLUTION INFILTRATION; PERINEURAL at 07:51

## 2025-07-07 RX ADMIN — SODIUM CHLORIDE, SODIUM LACTATE, POTASSIUM CHLORIDE, AND CALCIUM CHLORIDE 125 ML/HR: .6; .31; .03; .02 INJECTION, SOLUTION INTRAVENOUS at 06:52

## 2025-07-07 RX ADMIN — FENTANYL CITRATE 50 MCG: 50 INJECTION INTRAMUSCULAR; INTRAVENOUS at 07:51

## 2025-07-07 RX ADMIN — SODIUM CITRATE AND CITRIC ACID MONOHYDRATE 30 ML: 500; 334 SOLUTION ORAL at 06:52

## 2025-07-07 RX ADMIN — ASPIRIN 81 MG: 81 TABLET, COATED ORAL at 13:46

## 2025-07-07 SDOH — SOCIAL STABILITY: SOCIAL INSECURITY: WITHIN THE LAST YEAR, HAVE YOU BEEN HUMILIATED OR EMOTIONALLY ABUSED IN OTHER WAYS BY YOUR PARTNER OR EX-PARTNER?: NO

## 2025-07-07 SDOH — ECONOMIC STABILITY: INCOME INSECURITY: IN THE PAST 12 MONTHS HAS THE ELECTRIC, GAS, OIL, OR WATER COMPANY THREATENED TO SHUT OFF SERVICES IN YOUR HOME?: NO

## 2025-07-07 SDOH — HEALTH STABILITY: MENTAL HEALTH: HOW OFTEN DO YOU HAVE SIX OR MORE DRINKS ON ONE OCCASION?: NEVER

## 2025-07-07 SDOH — SOCIAL STABILITY: SOCIAL INSECURITY: ABUSE: ADULT

## 2025-07-07 SDOH — SOCIAL STABILITY: SOCIAL INSECURITY: WITHIN THE LAST YEAR, HAVE YOU BEEN AFRAID OF YOUR PARTNER OR EX-PARTNER?: NO

## 2025-07-07 SDOH — SOCIAL STABILITY: SOCIAL INSECURITY: HAS ANYONE EVER THREATENED TO HURT YOUR FAMILY OR YOUR PETS?: NO

## 2025-07-07 SDOH — SOCIAL STABILITY: SOCIAL INSECURITY
WITHIN THE LAST YEAR, HAVE YOU BEEN RAPED OR FORCED TO HAVE ANY KIND OF SEXUAL ACTIVITY BY YOUR PARTNER OR EX-PARTNER?: NO

## 2025-07-07 SDOH — SOCIAL STABILITY: SOCIAL INSECURITY: ARE THERE ANY APPARENT SIGNS OF INJURIES/BEHAVIORS THAT COULD BE RELATED TO ABUSE/NEGLECT?: NO

## 2025-07-07 SDOH — SOCIAL STABILITY: SOCIAL INSECURITY
WITHIN THE LAST YEAR, HAVE YOU BEEN KICKED, HIT, SLAPPED, OR OTHERWISE PHYSICALLY HURT BY YOUR PARTNER OR EX-PARTNER?: NO

## 2025-07-07 SDOH — SOCIAL STABILITY: SOCIAL INSECURITY: DO YOU FEEL ANYONE HAS EXPLOITED OR TAKEN ADVANTAGE OF YOU FINANCIALLY OR OF YOUR PERSONAL PROPERTY?: NO

## 2025-07-07 SDOH — HEALTH STABILITY: MENTAL HEALTH: CURRENT SMOKER: 0

## 2025-07-07 SDOH — ECONOMIC STABILITY: FOOD INSECURITY: WITHIN THE PAST 12 MONTHS, THE FOOD YOU BOUGHT JUST DIDN'T LAST AND YOU DIDN'T HAVE MONEY TO GET MORE.: NEVER TRUE

## 2025-07-07 SDOH — HEALTH STABILITY: MENTAL HEALTH: HOW OFTEN DO YOU HAVE A DRINK CONTAINING ALCOHOL?: 2-4 TIMES A MONTH

## 2025-07-07 SDOH — HEALTH STABILITY: MENTAL HEALTH: HOW MANY DRINKS CONTAINING ALCOHOL DO YOU HAVE ON A TYPICAL DAY WHEN YOU ARE DRINKING?: 1 OR 2

## 2025-07-07 SDOH — ECONOMIC STABILITY: FOOD INSECURITY: WITHIN THE PAST 12 MONTHS, YOU WORRIED THAT YOUR FOOD WOULD RUN OUT BEFORE YOU GOT THE MONEY TO BUY MORE.: NEVER TRUE

## 2025-07-07 SDOH — SOCIAL STABILITY: SOCIAL INSECURITY: DO YOU FEEL UNSAFE GOING BACK TO THE PLACE WHERE YOU ARE LIVING?: NO

## 2025-07-07 SDOH — SOCIAL STABILITY: SOCIAL INSECURITY: DOES ANYONE TRY TO KEEP YOU FROM HAVING/CONTACTING OTHER FRIENDS OR DOING THINGS OUTSIDE YOUR HOME?: NO

## 2025-07-07 SDOH — SOCIAL STABILITY: SOCIAL INSECURITY: ARE YOU OR HAVE YOU BEEN THREATENED OR ABUSED PHYSICALLY, EMOTIONALLY, OR SEXUALLY BY ANYONE?: NO

## 2025-07-07 SDOH — SOCIAL STABILITY: SOCIAL INSECURITY: WERE YOU ABLE TO COMPLETE ALL THE BEHAVIORAL HEALTH SCREENINGS?: YES

## 2025-07-07 SDOH — SOCIAL STABILITY: SOCIAL INSECURITY: HAVE YOU HAD THOUGHTS OF HARMING ANYONE ELSE?: NO

## 2025-07-07 ASSESSMENT — COGNITIVE AND FUNCTIONAL STATUS - GENERAL
DAILY ACTIVITIY SCORE: 24
CLIMB 3 TO 5 STEPS WITH RAILING: A LITTLE
STANDING UP FROM CHAIR USING ARMS: A LITTLE
MOVING TO AND FROM BED TO CHAIR: A LITTLE
PATIENT BASELINE BEDBOUND: NO
DAILY ACTIVITIY SCORE: 24
MOBILITY SCORE: 19
TURNING FROM BACK TO SIDE WHILE IN FLAT BAD: A LITTLE
CLIMB 3 TO 5 STEPS WITH RAILING: A LITTLE
WALKING IN HOSPITAL ROOM: A LITTLE
CLIMB 3 TO 5 STEPS WITH RAILING: A LITTLE
MOBILITY SCORE: 22
MOVING TO AND FROM BED TO CHAIR: A LITTLE
MOVING TO AND FROM BED TO CHAIR: A LITTLE
MOBILITY SCORE: 22

## 2025-07-07 ASSESSMENT — PAIN - FUNCTIONAL ASSESSMENT

## 2025-07-07 ASSESSMENT — PAIN DESCRIPTION - LOCATION: LOCATION: HEAD

## 2025-07-07 ASSESSMENT — PAIN SCALES - GENERAL
PAIN_LEVEL: 0
PAINLEVEL_OUTOF10: 0 - NO PAIN
PAINLEVEL_OUTOF10: 3
PAINLEVEL_OUTOF10: 0 - NO PAIN

## 2025-07-07 ASSESSMENT — ACTIVITIES OF DAILY LIVING (ADL)
ADL_ASSISTANCE: INDEPENDENT
HEARING - RIGHT EAR: FUNCTIONAL
DRESSING YOURSELF: INDEPENDENT
FEEDING YOURSELF: INDEPENDENT
BATHING: INDEPENDENT
JUDGMENT_ADEQUATE_SAFELY_COMPLETE_DAILY_ACTIVITIES: YES
LACK_OF_TRANSPORTATION: NO
WALKS IN HOME: INDEPENDENT
PATIENT'S MEMORY ADEQUATE TO SAFELY COMPLETE DAILY ACTIVITIES?: YES
TOILETING: INDEPENDENT
GROOMING: INDEPENDENT
HEARING - LEFT EAR: FUNCTIONAL
ADEQUATE_TO_COMPLETE_ADL: YES

## 2025-07-07 ASSESSMENT — PATIENT HEALTH QUESTIONNAIRE - PHQ9
2. FEELING DOWN, DEPRESSED OR HOPELESS: NOT AT ALL
1. LITTLE INTEREST OR PLEASURE IN DOING THINGS: NOT AT ALL
SUM OF ALL RESPONSES TO PHQ9 QUESTIONS 1 & 2: 0

## 2025-07-07 ASSESSMENT — LIFESTYLE VARIABLES
AUDIT-C TOTAL SCORE: 2
SKIP TO QUESTIONS 9-10: 1

## 2025-07-07 ASSESSMENT — PAIN DESCRIPTION - ORIENTATION: ORIENTATION: MID

## 2025-07-07 ASSESSMENT — PAIN DESCRIPTION - DESCRIPTORS: DESCRIPTORS: HEADACHE

## 2025-07-07 NOTE — ANESTHESIA PROCEDURE NOTES
Airway  Date/Time: 7/7/2025 7:53 AM  Reason: elective    Airway not difficult    Staffing  Performed: CRNA   Authorized by: ESME Fisher    Performed by: ESME Fisher  Patient location during procedure: OR    Patient Condition  Indications for airway management: anesthesia  Patient position: reverse Trendelenburg  MILS maintained throughout  Planned trial extubation  Sedation level: deep     Final Airway Details   Preoxygenated: yes  Final airway type: endotracheal airway  Successful airway: ETT  Cuffed: yes   Successful intubation technique: video laryngoscopy  Adjuncts used in placement: intubating stylet  Endotracheal tube insertion site: oral  Blade type: Chug.  Blade size: #3  ETT size (mm): 7.0  Cormack-Lehane Classification: grade IIa - partial view of glottis  Placement verified by: capnometry   Measured from: lips  ETT to lips (cm): 21  Number of attempts at approach: 1

## 2025-07-07 NOTE — PROGRESS NOTES
Physical Therapy    Physical Therapy Evaluation    Patient Name: Ana Bustillos  MRN: [id number]  Today's Date: 7/7/2025   Time Calculation  Start Time: 1414  Stop Time: 1454  Time Calculation (min): 40 min  3327/3327-A    Assessment/Plan   PT Assessment  PT Assessment Results: Decreased strength, Decreased endurance, Decreased mobility, Decreased range of motion  Rehab Prognosis: Good  Barriers to Discharge Home: No anticipated barriers  Evaluation/Treatment Tolerance: Patient tolerated treatment well  Medical Staff Made Aware: Yes  End of Session Communication: Bedside nurse  Assessment Comment: Patient AMB to 2ft to BSC and 12ft in room with FWW min A x 1 progressing to CGA x 1 and cueing for precautions and safety. Patient understanding of education with motivation to return to PLOF. Would benefit from continued PT focusing on functional mobility and safety. Rec is LOW.  End of Session Patient Position: Bed, 3 rail up, Alarm on (Abduction pillow in place)  IP OR SWING BED PT PLAN  Inpatient or Swing Bed: Inpatient  PT Plan  Treatment/Interventions: Bed mobility, Transfer training, Gait training, Stair training, Balance training, Neuromuscular re-education, Strengthening, Endurance training, Range of motion, Therapeutic exercise, Therapeutic activity  PT Plan: Ongoing PT  PT Frequency: BID  PT Discharge Recommendations: Low intensity level of continued care  Equipment Recommended upon Discharge:  (TBD)  PT Recommended Transfer Status: Assist x1  PT - OK to Discharge: Yes (When medically cleared)        General Visit Information:  General  Reason for Referral: Recent surgery; R IAN  Referred By: Ruthy  Past Medical History Relevant to Rehab: Pres: R IAN; PMHx: HTN, HLD, prediabetes, osteopenia, sleep apnea, OA, GERD  Family/Caregiver Present: Yes (Daughter)  Prior to Session Communication: Bedside nurse  Patient Position Received: Bed, 3 rail up, Alarm on (Abduction pillow in place)  General Comment: Room 3327.  Patient agreeable and motivated for therapy. Daughter present.    Home Living:  Home Living  Home Living Comments: Patient states she is going to stay at [facility] post discharge, which is 1 level home,4 steps to enter with handrail, walk in shower and shower chair.    Prior Level of Function:  Prior Function Per Pt/Caregiver Report  Level of [place]: Independent with ADLs and functional transfers, Independent with homemaking with ambulation  Receives Help From: Family (prn)  ADL Assistance: Independent  Homemaking Assistance: Independent  Ambulatory Assistance: Independent (Cane)  Prior Function Comments: Patient states she is retired, independent prior and attending pool therapy classes.    Precautions:  Precautions  LE Weight Bearing Status: Weight Bearing as Tolerated (WBAT BLE)  Medical Precautions: Fall precautions  Post-Surgical Precautions: Right hip precautions  Precautions Comment: WBAT BLE    Vital Signs:     Objective     Pain:  Pain Assessment  0-10 (Numeric) Pain Score: 0 - No pain    Cognition:  Cognition  Overall Cognitive Status: Within Functional Limits  Attention: Within Functional Limits  Memory: Within Funtional Limits  Insight: Within function limits  Impulsive: Within functional limits  Processing Speed: Within funtional limits    General Assessments:  General Observation  General Observation: Patient education on exercises and precautions discussed, demonstrated and verbalized an understanding   Activity Tolerance  Endurance: Decreased tolerance for upright activites  Activity Tolerance Comments: Tolerated activity well. No pain with mobility this session  Sensation  Sensation Comment: Denies numbness/tingling  Strength  Strength Comments: LLE grossly WFL; RLE not formally assessed MMT due to recent surgery. Patient demonstrated 3/5 RLE knee and ankle with full ROM        Postural Control  Postural Control: Within Functional Limits  Static Sitting Balance  Static Sitting-Comment/Number  of Minutes: Fair+ sitting EOB and BSC SBA; 8 min  Dynamic Sitting Balance  Dynamic Sitting-Comments: Fair CGA  Static Standing Balance  Static Standing-Comment/Number of Minutes: Fair FWW CGA x 1; 5 min  Dynamic Standing Balance  Dynamic Standing-Comments: Fair FWW with AMB    Functional Assessments:     Bed Mobility  Bed Mobility:  (Supine <> sit EOB w/HOB elevated CGA x 1 with use of rails. Cueing for hand placement, safety, sequencing, and precaution maintenance.)  Transfers  Transfer:  (Sit to stand from EOB FWW min A x 1 with cueing for WBAT and hip precautions, safety, and sequencing. Sit to stand from BSC 2 times FWW min A x 1 with verbal cueing and education on safety and precautions.)     Stairs  Stairs:  (AMB 2ft EOB to BSC FWW min A x 1 with cueing for safety, precautions, and sequencing. AMB 12ft FWW min A x1 progressing to CGA x 1 with verbal cues for proper sequencing with surgical leg and FWW. Demonstrated understanding of precautions with AMB)       Extremity/Trunk Assessments:                Outcome Measures:     [facility] Basic Mobility  Turning from your back to your side while in a flat bed without using bedrails: None  Moving from lying on your back to sitting on the side of a flat bed without using bedrails: A little  Moving to and from bed to chair (including a wheelchair): A little  Standing up from a chair using your arms (e.g. wheelchair or bedside chair): A little  To walk in hospital room: A little  Climbing 3-5 steps with railing: A little  Basic Mobility - Total Score: 19                                                             Goals:  Encounter Problems       Encounter Problems (Active)       PT Problem       Transfers       Start:  07/07/25    Expected End:  07/21/25       Patient will perform all transfers with FWW SBA x 1 maintaining R hip precautions and WBAT BLE for safety to return home           Gait       Start:  07/07/25    Expected End:  07/21/25       Patient will amb 50+  feet with FWW CGA x 1 maintaining R hip precautions and WBAT BLE for safety           Stairs       Start:  07/07/25    Expected End:  07/21/25       Patient will ascend/descend 4+ stairs with rail/device prn and SBA/CGA x1 while maintaining WBAT BLE and R hip precautions              PT Problem       Strengthening       Start:  07/07/25    Expected End:  07/21/25       Patient will perform 20+ reps of AROM/RROM for MICA LE's to improve safety and functional independence                 Education Documentation  Handouts, taught by KENIA Garcia at 7/7/2025  3:44 PM.  Learner: Patient  Readiness: Acceptance  Method: Explanation  Response: Demonstrated Understanding, Verbalizes Understanding  Comment: For increased safety and maintaining precautions post surgery    Precautions, taught by KENIA Garcia at 7/7/2025  3:44 PM.  Learner: Patient  Readiness: Acceptance  Method: Explanation  Response: Demonstrated Understanding, Verbalizes Understanding  Comment: For increased safety and maintaining precautions post surgery    Home Exercise Program, taught by KENIA Garcia at 7/7/2025  3:44 PM.  Learner: Patient  Readiness: Acceptance  Method: Explanation  Response: Demonstrated Understanding, Verbalizes Understanding  Comment: For increased safety and maintaining precautions post surgery    Mobility Training, taught by KENIA Garcia at 7/7/2025  3:44 PM.  Learner: Patient  Readiness: Acceptance  Method: Explanation  Response: Demonstrated Understanding, Verbalizes Understanding  Comment: For increased safety and maintaining precautions post surgery    Education Comments  No comments found.        Completion of this session, clinical decision making, and documentation performed under the supervision/direction of Tasha Davis.      KENIA GARCIA

## 2025-07-07 NOTE — ANESTHESIA POSTPROCEDURE EVALUATION
Patient: Ana Butsillos    Procedure Summary       Date: 07/07/25 Room / Location: POR OR 07 / Virtual [facility] OR    Anesthesia Start: 0745 Anesthesia Stop: 0952    Procedure: RIGHT TOTAL HIP ARTHROPLASTY *23 HR OBS* (GEN/BLOCK) *DAWIT ACCOLADE II* (Right: Hip) Diagnosis:       Primary osteoarthritis of right hip      (Primary osteoarthritis of right hip [M16.11])    Surgeons: Christopher Bliss MD Responsible Provider: ESME Fisher    Anesthesia Type: general, regional ASA Status: 3            Anesthesia Type: general, regional    Vitals Value Taken Time   /81 07/07/25 10:20   Temp 36.6 °C (97.8 °F) 07/07/25 09:47   Pulse 60 07/07/25 10:26   Resp 15 07/07/25 10:26   SpO2 94 % 07/07/25 10:26   Vitals shown include unfiled device data.    Anesthesia Post Evaluation    Patient location during evaluation: PACU  Patient participation: complete - patient participated  Level of consciousness: awake and alert  Pain score: 0  Pain management: adequate  Multimodal analgesia pain management approach  Airway patency: patent  Cardiovascular status: acceptable and hemodynamically stable  Respiratory status: acceptable and room air  Hydration status: acceptable  Postoperative Nausea and Vomiting: none        There were no known notable events for this encounter.

## 2025-07-07 NOTE — OP NOTE
RIGHT TOTAL HIP ARTHROPLASTY *23 HR OBS* (GEN/BLOCK) *DAWIT ACCOLADE II* (R) Operative Note     Date: 2025  OR Location: [facility]    Name: Ana Bustillos, : 1960, Age: 64 y.o., MRN: [id number], Sex: female    Diagnosis  Pre-op Diagnosis      * Primary osteoarthritis of right hip [M16.11] Post-op Diagnosis     * Primary osteoarthritis of right hip [M16.11]     Procedures  RIGHT TOTAL HIP ARTHROPLASTY *23 HR OBS* (GEN/BLOCK) *DAWIT ACCOLADE II*  02810 - MI ARTHRP ACETBLR/PROX FEM PROSTC AGRFT/ALGRFT      Surgeons      * Christopher Bliss - Primary    Resident/Fellow/Other Assistant:  Surgeons and Role:     * Casandra Slaughter PA-C - Assisting    Staff:   Circulator: Riya Bowman Person: Neymar  Surgical Assistant: Coni Bowman Person: Tesha Reed Circulator: Yeny    Anesthesia Staff: CRNA: LUKE Fisher-CRNA    Procedure Summary  Anesthesia: Regional, General  ASA: III  Estimated Blood Loss: 125 mL  Intra-op Medications:   Administrations occurring from 0705 to 0920 on 25:   Medication Name Total Dose   ropivacaine-epinephrine-clonidine-ketorolac 2.46-0.005- 0.0008-0.3mg/mL periarticular syringe 50 mL   sodium chloride 0.9 % irrigation solution 4,000 mL   dexAMETHasone (Decadron) 4 mg/mL IV Syringe 2 mL 8 mg   dexAMETHasone (Decadron) 4 mg/mL IV Syringe 2 mL 4 mg   fentaNYL (Sublimaze) injection 50 mcg/mL 100 mcg   lactated Ringer's infusion Cannot be calculated   lidocaine (Xylocaine) injection 2 % 100 mg   lidocaine 2%-EPINEPHrine 1:100,000 5 mL   midazolam (Versed) injection 1 mg/mL 2 mg   phenylephrine 40 mcg/mL syringe 10 mL 500 mcg   propofol (Diprivan) injection 10 mg/mL 170 mg   rocuronium (ZeMuron) 50 mg/5 mL injection 50 mg   ropivacaine PF (Naropin) 0.5 % 20 mL   tranexamic acid (Cyklokapron) injection 2,000 mg   vancomycin (Vancocin) 1,000 mg in dextrose 5% 250 mL IV 1,000 g              Anesthesia Record               Intraprocedure I/O Totals          Intake     Tranexamic Acid 0.00 mL    The total shown is the total volume documented since Anesthesia Start was filed.    lactated Ringer's infusion 1000.00 mL    Total Intake 1000 mL       Output    Est. Blood Loss 125 mL    Total Output 125 mL       Net    Net Volume 875 mL          Specimen: No specimens collected              Drains and/or Catheters: * None in log *    Tourniquet Times:         Implants:  Implants       Type Name Action Serial No.      Joint INSERT, TRIDENT X3 POLYETHYLENE, 10 DEG, 36MM E - [id number] Implanted      Screw SCREW, LOW PROFILE HEX, 6.5 X 25 MM - [id number] Implanted      Joint SHELL, TRIDENT II, CLUSTERHOLE, 52E - [id number] Implanted      Joint STEM, FEMUR 132D SZ 6 ACCOLADE II - [id number] Implanted      Joint HEAD, FEMUR V40 36MM 0MM BIOLOX DELTA - [id number] Implanted               Findings: Severe osteoarthritis right hip    Indications: Ana Bustillos is an 64 y.o. female who is having surgery for Primary osteoarthritis of right hip [M16.11].  Surgical preoperative consent was obtained from the patient    The patient was seen in the preoperative area. The risks, benefits, complications, treatment options, non-operative alternatives, expected recovery and outcomes were discussed with the patient. The possibilities of reaction to medication, pulmonary aspiration, injury to surrounding structures, bleeding, recurrent infection, the need for additional procedures, failure to diagnose a condition, and creating a complication requiring transfusion or operation were discussed with the patient. The patient concurred with the proposed plan, giving informed consent.  The site of surgery was properly noted/marked if necessary per policy. The patient has been actively warmed in preoperative area. Preoperative antibiotics have been ordered and given within 1 hours of incision. Venous thrombosis prophylaxis have been ordered including unilateral sequential compression device    Procedure Details:  "Procedure Details: Patient brought to the OR and placed supine on the OR table.  Patient received a preoperative block and also appropriate IV antibiotics, and 1 g of TXA before the start of the case.  After successful general anesthetic he was placed in the lateral decubitus position with the operative side up taking care to protect the airway and all bony prominences and peripheral nerves.  We then began by doing a routine sterile prep and drape of the operative lower extremity.  During the procedure and the subcutaneous plane fascial plane we did inject a total of 50 cc of a \"cocktail\" of ropivacaine, epinephrine, Toradol, and Klonopin.  We made our incision with a 10 blade approximately 15 cm longitudinal incision over the posterior lateral proximal femur in the usual location for a posterior approach.  After sharp dissection through the dermis blunt dissection brought us down to the subcutaneous plane where we used a key elevator to remove subcutaneous fat from the fascia shea.  Over the proximal femur greater trochanteric region we made a small incision in the fascia shea and with leg leg abduction and a Scott scissor we extended this fascia shea incision distally and proximally to expose the hip region.  With internal rotation of the hip we exposed the short external rotators and posterior capsule.  We carefully resected the trochanteric bursal tissue to expose these rotators.  Then using 10 blade and electrocautery we made a full-thickness cut and the posterior capsule and external rotators teeing superiorly to expose the posterior hip joint by reflecting the capsule and rotators posteriorly to protect sciatic nerve.  We placed two #1 Vicryl stay sutures into the capsular rotator cuff interval and tagged them for later suturing.  Should be noted during this case we did place a Charnley bow retractor in the fascia shea layer to help expose exposure and also used Hohmann retractors to expose proximally the " gluteal muscles and a blunt Hohmann around the neck and calcar of the femur for exposure.  With the capsule open we then dislocated the hip and internally rotated to have it enter the posterior wound.  We used the neck cutting guide to measure a neck cut appropriate angle 1.5 cm proximal to the lesser trochanter and used a sagittal saw to cut the neck and remove the femoral head the head was then measured for the purposes of reaming the acetabulum.  We then placed a Hohmann retractor in the anterior wall of the acetabulum and reflected the proximal femur anteriorly to expose the acetabulum.  We removed degenerative spurs and labral tissue from the rim of the acetabulum and also remove the foveal tissue along with the stump of the ligamentum teres, electrocauterize and artery of the ligamentum teres.  We then did progressive reaming of the acetabulum first with a smaller reamer to medialize to the floor the pelvis.  And then progressive reamings were performed up to the same size acetabular shell we ended up using.  We placed the titanium uncemented acetabulum shell into the reamed acetabulum securing it in 45 degrees of flexion and 15 to 20 degrees of anteversion.  With the cup placed correctly and secured it was then stabilized with 1 acetabular shell screw in the superior posterior region of the pelvis in usual manner.  We then placed a 10 degree hooded polyethylene into the acetabular shell and then cover the acetabulum for completing the femoral stem size.  Then the femoral proximal portion was rotated into the wound with 90 degrees internal rotation.  With appropriate exposure we then used a  to remove the lateral femoral neck.  We used a canal finder to find the correct proximal femoral canal and then did progressive broaching up to the appropriate size broach for the femoral stem.  This broach was left in place for trial reduction with the trial components.  We then with the trial components check  leg lengths and appropriate stable full motion.  We then removed the trial component placed the final femoral stem into to 15 degrees anteversion and then tapped on the final appropriate sized length neck/ceramic head combo onto the trunnion of the titanium femoral stem.  Our final reduction was performed in usual manner once again checked full range and stable motion.  We then used pulsatile lavage to clean the region of surgery.  Controlled bleeding throughout with electrocautery and TXA a second gram dose at the end of the case.  We also soaked the components in saline and Betadine for a minute and then her final pulsatile lavage.  We then closed in layers the short external rotary Tatar's and probe posterior capsule were closed over drill holes through the posterior greater trochanteric region.  Fascia shea was closed with running locking #1 Vicryl suture.  The subdermal camper's fascia was closed with a running 0 Vicryl suture skin was closed with buried interrupted 2-0 Vicryl suture and surgical staples.  At the end the case we cleansed the skin and placed a sterile dressing.  The patient was then awoken in stable condition transferred to recovery.   Evidence of Infection: No   Complications:  None; patient tolerated the procedure well.    Disposition: PACU - hemodynamically stable.  Condition: stable     Implants: Veronica  Accolade 2 uncemented total hip, size number six 132 degree neck femur, 36 mm 0 neck ceramic head, 52 mm acetabular shell, 10 degree hooded polyethylene.    Task Performed by RNFA or Surgical Assistant:  Retraction and help closure          Additional Details: Casandra Slaughter PA-C was present for the entire case. Their assistance was necessary and critical to the successful completion of the procedure.They provided skilled assistance with leg positioning, retraction, leg manipulation, implant insertion and wound closure.  A qualified assistant was not available to perform their portion of  the case.     Attending Attestation: I was present for the entire procedure.    Christopher Bliss  Phone Number: [phone]

## 2025-07-07 NOTE — ANESTHESIA PREPROCEDURE EVALUATION
Patient: Ana Bustillos    Procedure Information       Date/Time: 07/07/25 0705    Procedure: RIGHT TOTAL HIP ARTHROPLASTY *23 HR OBS* (GEN/BLOCK) *DAWIT ACCOLADE II* (Right: Hip) - GENERAL / BLOCK , 90 ,MINUTES, 23 HOUR OBS, DAWIT ACCOLADE II    Location: [facility] / [facility]    Surgeons: Christopher Bliss MD            Relevant Problems   Cardiac   (+) Essential hypertension   (+) Hyperlipidemia   (+) PVC (premature ventricular contraction)      GI   (+) Gastroesophageal reflux disease without esophagitis      /Renal   (+) Malignant neoplasm of right kidney (Multi)      Musculoskeletal   (+) Primary osteoarthritis of right hip       Clinical information reviewed:   Tobacco  Allergies  Meds  Problems  Med Hx  Surg Hx   Fam Hx  Soc   Hx        NPO Detail:  NPO/Void Status  Date of Last Liquid: 07/06/25  Date of Last Solid: 07/06/25         Physical Exam    Airway  Mallampati: II  Mouth opening: 3 or more finger widths     Cardiovascular   Rhythm: regular  Rate: normal     Dental - normal exam     Pulmonary - normal exam   Abdominal - normal exam           Anesthesia Plan    History of general anesthesia?: yes  History of complications of general anesthesia?: no    ASA 3     general and regional     The patient is not a current smoker.  Patient was not previously instructed to abstain from smoking on day of procedure.  Patient did not smoke on day of procedure.    Anesthetic plan and risks discussed with patient.  Use of blood products discussed with patient who consented to blood products.    Plan discussed with CRNA.

## 2025-07-07 NOTE — CARE PLAN
The patient's goals for the shift include getting up and going to the bathroom for therapy    The clinical goals for the shift include maintain pain <5      Problem: Pain - Adult  Goal: Verbalizes/displays adequate comfort level or baseline comfort level  Outcome: Progressing  Flowsheets (Taken 7/7/2025 5650)  Verbalizes/displays adequate comfort level or baseline comfort level:   Encourage patient to monitor pain and request assistance   Assess pain using appropriate pain scale   Administer analgesics based on type and severity of pain and evaluate response   Implement non-pharmacological measures as appropriate and evaluate response     Problem: Safety - Adult  Goal: Free from fall injury  Outcome: Progressing     Problem: Pain  Goal: Takes deep breaths with improved pain control throughout the shift  Outcome: Progressing  Goal: Turns in bed with improved pain control throughout the shift  Outcome: Progressing  Goal: Participates in PT with improved pain control throughout the shift  Outcome: Progressing

## 2025-07-07 NOTE — NURSING NOTE
Patient remains stable with minimal pain complaints. Pain adequately controlled with ice packs. Ambulated with PT. Tolerated it well. Neurovascular checks continue without issue. Daughter at bedside. Proper use of call light noted. Bed alarm active. Will continue to monitor

## 2025-07-07 NOTE — NURSING NOTE
Patient arrived to unit from PACU in stable condition. Alert, oriented. Abductor pillow in place. Deniz hose on. +neurovascular checks. Skin intact aside from incision to left hip that is covered in dressing. No drainage noted. Wrist band verified identity. Call light in reach. Bed alarm active

## 2025-07-07 NOTE — ANESTHESIA PROCEDURE NOTES
Peripheral Block    Patient location during procedure: pre-op  Medication administered at: 7/7/2025 7:10 AM  End time: 7/7/2025 7:20 AM  Reason for block: at surgeon's request and post-op pain management  Staffing  Performed: CRNA   Authorized by: ESME Fisher    Performed by: ESME Camacho  Preanesthetic Checklist  Completed: patient identified, IV checked, site marked, risks and benefits discussed, surgical consent, monitors and equipment checked, pre-op evaluation and timeout performed   Timeout performed at: 7/7/2025 7:08 AM  Peripheral Block  Patient position: laying flat  Prep: ChloraPrep  Patient monitoring: heart rate, cardiac monitor and continuous pulse ox  Block type: other  Injection technique: single-shot  Guidance: ultrasound guided  Local infiltration: ropivacaine  Infiltration strength: 0.5 %  Dose: 20 mL  Needle  Needle gauge: 20 G  Needle length: 8 cm  Needle localization: ultrasound guidance  Assessment  Injection assessment: local visualized surrounding nerve on ultrasound, incremental injection, no paresthesia on injection and negative aspiration for heme  Paresthesia pain: none  Heart rate change: no  Slow fractionated injection: yes  Additional Notes  Pericapsular Nerve Group block performed by request of Dr. De La Garza  for RIGHT  hip. Chart reviewed and deemed appropriate for procedure. Informed consent obtained. Timeout performed, area marked and verified by patient and RN. Monitors and oxygen via nasal cannula applied and patient medicated with midazolam 2_mg and fentanyl 50 mcg. Site cleaned with chloraprep. Anterior inferior iliac spine identified via ultrasound, psoas tendon, iliopsoas muscle, and femoral artery identified and visualized throughout procedure. 20G stimuplex needle used with tip identified via ultrasound throughout procedure. Incremental aspiration every 3-5mL of 20mL ropivicaine 0.5% with 4mg dexamethasone injected in pericapsular space without  resistance and free of blood. The patient tolerated the procedure well without report of intense pain, tinnitus, metallic taste, or circumoral numbness.  The block was then evaluated after a few minutes and appeared to be functioning appropriately.

## 2025-07-08 ENCOUNTER — PHARMACY VISIT (OUTPATIENT)
Dept: PHARMACY | Facility: CLINIC | Age: 65
End: 2025-07-08
Payer: COMMERCIAL

## 2025-07-08 ENCOUNTER — DOCUMENTATION (OUTPATIENT)
Dept: HOME HEALTH SERVICES | Facility: HOME HEALTH | Age: 65
End: 2025-07-08
Payer: MEDICARE

## 2025-07-08 ENCOUNTER — HOME HEALTH ADMISSION (OUTPATIENT)
Dept: HOME HEALTH SERVICES | Facility: HOME HEALTH | Age: 65
End: 2025-07-08
Payer: MEDICARE

## 2025-07-08 VITALS
DIASTOLIC BLOOD PRESSURE: 75 MMHG | TEMPERATURE: 97.4 F | WEIGHT: 207 LBS | BODY MASS INDEX: 32.49 KG/M2 | HEART RATE: 59 BPM | SYSTOLIC BLOOD PRESSURE: 117 MMHG | OXYGEN SATURATION: 97 % | HEIGHT: 67 IN | RESPIRATION RATE: 18 BRPM

## 2025-07-08 LAB
ABO GROUP (TYPE) IN BLOOD: NORMAL
ANTIBODY SCREEN: NORMAL
ERYTHROCYTE [DISTWIDTH] IN BLOOD BY AUTOMATED COUNT: 14 % (ref 11.5–14.5)
HCT VFR BLD AUTO: 34.5 % (ref 36–46)
HGB BLD-MCNC: 11.4 G/DL (ref 12–16)
MCH RBC QN AUTO: 30.3 PG (ref 26–34)
MCHC RBC AUTO-ENTMCNC: 33 G/DL (ref 32–36)
MCV RBC AUTO: 92 FL (ref 80–100)
NRBC BLD-RTO: 0 /100 WBCS (ref 0–0)
PLATELET # BLD AUTO: 192 X10*3/UL (ref 150–450)
RBC # BLD AUTO: 3.76 X10*6/UL (ref 4–5.2)
RH FACTOR (ANTIGEN D): NORMAL
WBC # BLD AUTO: 12.9 X10*3/UL (ref 4.4–11.3)

## 2025-07-08 PROCEDURE — 36415 COLL VENOUS BLD VENIPUNCTURE: CPT

## 2025-07-08 PROCEDURE — 7100000011 HC EXTENDED STAY RECOVERY HOURLY - NURSING UNIT

## 2025-07-08 PROCEDURE — 97116 GAIT TRAINING THERAPY: CPT | Mod: GP,CQ

## 2025-07-08 PROCEDURE — 2500000001 HC RX 250 WO HCPCS SELF ADMINISTERED DRUGS (ALT 637 FOR MEDICARE OP)

## 2025-07-08 PROCEDURE — 86901 BLOOD TYPING SEROLOGIC RH(D): CPT | Performed by: ANESTHESIOLOGY

## 2025-07-08 PROCEDURE — 97110 THERAPEUTIC EXERCISES: CPT | Mod: GP,CQ

## 2025-07-08 PROCEDURE — 97165 OT EVAL LOW COMPLEX 30 MIN: CPT | Mod: GO

## 2025-07-08 PROCEDURE — 85027 COMPLETE CBC AUTOMATED: CPT

## 2025-07-08 PROCEDURE — RXMED WILLOW AMBULATORY MEDICATION CHARGE

## 2025-07-08 RX ORDER — NALOXONE HYDROCHLORIDE 4 MG/.1ML
SPRAY NASAL
Qty: 2 EACH | Refills: 0 | OUTPATIENT
Start: 2025-07-08

## 2025-07-08 RX ORDER — HYDROCODONE BITARTRATE AND ACETAMINOPHEN 5; 325 MG/1; MG/1
2 TABLET ORAL EVERY 6 HOURS PRN
Qty: 40 TABLET | Refills: 0 | Status: SHIPPED | OUTPATIENT
Start: 2025-07-08

## 2025-07-08 RX ORDER — NAPROXEN SODIUM 220 MG/1
81 TABLET, FILM COATED ORAL 2 TIMES DAILY
Qty: 60 TABLET | Refills: 0 | Status: SHIPPED | OUTPATIENT
Start: 2025-07-08 | End: 2025-08-07

## 2025-07-08 RX ADMIN — ACETAMINOPHEN 650 MG: 325 TABLET ORAL at 14:14

## 2025-07-08 RX ADMIN — ACETAMINOPHEN 650 MG: 325 TABLET ORAL at 01:12

## 2025-07-08 RX ADMIN — FAMOTIDINE 20 MG: 20 TABLET, FILM COATED ORAL at 06:21

## 2025-07-08 RX ADMIN — ASPIRIN 81 MG: 81 TABLET, COATED ORAL at 08:19

## 2025-07-08 RX ADMIN — ACETAMINOPHEN 650 MG: 325 TABLET ORAL at 08:19

## 2025-07-08 ASSESSMENT — COGNITIVE AND FUNCTIONAL STATUS - GENERAL
TURNING FROM BACK TO SIDE WHILE IN FLAT BAD: A LITTLE
MOVING TO AND FROM BED TO CHAIR: A LITTLE
MOVING FROM LYING ON BACK TO SITTING ON SIDE OF FLAT BED WITH BEDRAILS: A LITTLE
HELP NEEDED FOR BATHING: A LITTLE
DRESSING REGULAR LOWER BODY CLOTHING: A LITTLE
DAILY ACTIVITIY SCORE: 21
WALKING IN HOSPITAL ROOM: A LITTLE
MOBILITY SCORE: 19
STANDING UP FROM CHAIR USING ARMS: A LITTLE
MOBILITY SCORE: 19
STANDING UP FROM CHAIR USING ARMS: A LITTLE
TOILETING: A LITTLE
WALKING IN HOSPITAL ROOM: A LITTLE
MOVING TO AND FROM BED TO CHAIR: A LITTLE
TOILETING: A LITTLE
CLIMB 3 TO 5 STEPS WITH RAILING: A LITTLE
MOVING TO AND FROM BED TO CHAIR: A LITTLE
CLIMB 3 TO 5 STEPS WITH RAILING: A LOT
MOBILITY SCORE: 18
DRESSING REGULAR LOWER BODY CLOTHING: A LITTLE
WALKING IN HOSPITAL ROOM: A LITTLE
TURNING FROM BACK TO SIDE WHILE IN FLAT BAD: A LITTLE
HELP NEEDED FOR BATHING: A LITTLE
PERSONAL GROOMING: A LITTLE
CLIMB 3 TO 5 STEPS WITH RAILING: A LITTLE
TURNING FROM BACK TO SIDE WHILE IN FLAT BAD: A LITTLE
DAILY ACTIVITIY SCORE: 20

## 2025-07-08 ASSESSMENT — PAIN SCALES - GENERAL
PAINLEVEL_OUTOF10: 3
PAINLEVEL_OUTOF10: 0 - NO PAIN
PAINLEVEL_OUTOF10: 2
PAINLEVEL_OUTOF10: 0 - NO PAIN
PAINLEVEL_OUTOF10: 2
PAINLEVEL_OUTOF10: 0 - NO PAIN

## 2025-07-08 ASSESSMENT — PAIN - FUNCTIONAL ASSESSMENT
PAIN_FUNCTIONAL_ASSESSMENT: 0-10

## 2025-07-08 ASSESSMENT — PAIN DESCRIPTION - DESCRIPTORS: DESCRIPTORS: ACHING

## 2025-07-08 ASSESSMENT — PAIN DESCRIPTION - ORIENTATION: ORIENTATION: RIGHT

## 2025-07-08 ASSESSMENT — ACTIVITIES OF DAILY LIVING (ADL)
ADL_ASSISTANCE: INDEPENDENT
LACK_OF_TRANSPORTATION: NO
BATHING_ASSISTANCE: STAND BY

## 2025-07-08 ASSESSMENT — PAIN DESCRIPTION - LOCATION: LOCATION: HIP

## 2025-07-08 NOTE — CARE PLAN
The patient's goals for the shift include getting up and going to the bathroom for therapy    The clinical goals for the shift include Maintain patient's safety and manage patient's pain throughout the shift    Over the shift, the patient did make progress toward the following goals.       Problem: Pain - Adult  Goal: Verbalizes/displays adequate comfort level or baseline comfort level  Outcome: Progressing  Flowsheets (Taken 7/7/2025 2312)  Verbalizes/displays adequate comfort level or baseline comfort level:   Encourage patient to monitor pain and request assistance   Assess pain using appropriate pain scale   Administer analgesics based on type and severity of pain and evaluate response   Consider cultural and social influences on pain and pain management   Implement non-pharmacological measures as appropriate and evaluate response   Notify Licensed Independent Practitioner if interventions unsuccessful or patient reports new pain     Problem: Safety - Adult  Goal: Free from fall injury  Outcome: Progressing  Flowsheets (Taken 7/7/2025 2312)  Free from fall injury:   Instruct family/caregiver on patient safety   Based on caregiver fall risk screen, instruct family/caregiver to ask for assistance with transferring infant if caregiver noted to have fall risk factors     Problem: Discharge Planning  Goal: Discharge to home or other facility with appropriate resources  Outcome: Progressing  Flowsheets (Taken 7/7/2025 2312)  Discharge to home or other facility with appropriate resources:   Identify barriers to discharge with patient and caregiver   Arrange for needed discharge resources and transportation as appropriate   Identify discharge learning needs (meds, wound care, etc)   Arrange for interpreters to assist at discharge as needed   Refer to discharge planning if patient needs post-hospital services based on physician order or complex needs related to functional status, cognitive ability or social support  system     Problem: Chronic Conditions and Co-morbidities  Goal: Patient's chronic conditions and co-morbidity symptoms are monitored and maintained or improved  Outcome: Progressing  Flowsheets (Taken 7/7/2025 2319)  Care Plan - Patient's Chronic Conditions and Co-Morbidity Symptoms are Monitored and Maintained or Improved:   Monitor and assess patient's chronic conditions and comorbid symptoms for stability, deterioration, or improvement   Collaborate with multidisciplinary team to address chronic and comorbid conditions and prevent exacerbation or deterioration   Update acute care plan with appropriate goals if chronic or comorbid symptoms are exacerbated and prevent overall improvement and discharge     Problem: Nutrition  Goal: Nutrient intake appropriate for maintaining nutritional needs  Outcome: Progressing     Problem: Pain  Goal: Takes deep breaths with improved pain control throughout the shift  Outcome: Progressing  Goal: Turns in bed with improved pain control throughout the shift  Outcome: Progressing  Goal: Performs ADL's with improved pain control throughout shift  Outcome: Progressing  Goal: Participates in PT with improved pain control throughout the shift  Outcome: Progressing  Goal: Free from opioid side effects throughout the shift  Outcome: Progressing  Goal: Free from acute confusion related to pain meds throughout the shift  Outcome: Progressing

## 2025-07-08 NOTE — PROGRESS NOTES
Physical Therapy    Physical Therapy Treatment    Patient Name: Ana Bustillos  MRN: [id number]  Department: [address]  Room: [address] [place] [address]  Today's Date: 7/8/2025  Time Calculation  Start Time: 0750  Stop Time: 0820  Time Calculation (min): 30 min         Assessment/Plan   PT Assessment  Barriers to Discharge Home: No anticipated barriers  End of Session Communication: Bedside nurse  Assessment Comment: pateitn with good balance, no safety concerns noted , performed exercises wiht minimal cuein.  reviewed home going preucations  End of Session Patient Position: Up in chair, Alarm off, not on at start of session  PT Plan  Inpatient/Swing Bed or Outpatient: Inpatient  PT Plan  Treatment/Interventions: Bed mobility, Transfer training, Gait training, Stair training, Balance training, Neuromuscular re-education, Strengthening, Endurance training, Range of motion, Therapeutic exercise, Therapeutic activity  PT Plan: Ongoing PT  PT Frequency: BID  PT Discharge Recommendations: Low intensity level of continued care  Equipment Recommended upon Discharge:  (TBD)  PT Recommended Transfer Status: Assist x1  PT - OK to Discharge: Yes (When medically cleared)    PT Visit Info:  PT Received On: 07/08/25  Response to Previous Treatment: Patient reporting fatigue but able to participate.     General Visit Information:   General  Prior to Session Communication: Bedside nurse  Patient Position Received: Alarm off, not on at start of session, Up in chair  General Comment: patient  eager for  therapy.   Subjective   Precautions:  Precautions  Precautions Comment: WBAT BLE            Objective   Pain:  Pain Assessment  Pain Assessment: 0-10  0-10 (Numeric) Pain Score: 3  Pain Interventions: Ambulation/increased activity  Response to Interventions: Decrease in pain  Cognition:  Cognition  Overall Cognitive Status: Within Functional Limits    Activity Tolerance:  Activity Tolerance  Endurance: Tolerates 30 min exercise with multiple  rests  Treatments:  patient performed  HEP with  30 reps isometrics, 15 reps all others with self assist for heel slides,  all transfers supervision, gait training with  feet to stairs ascend/ descend 3 steps with one railing cGA. gait training 100 feet  back  to room cGa  bed mobility  supine to sit  sit to supine cGA with  verbal  cues and hand railing. gait training  10 feet to recliner CGa. stand to sit.  able to list home going precautions.  Outcome Measures:  [facility] Basic Mobility  Turning from your back to your side while in a flat bed without using bedrails: None  Moving from lying on your back to sitting on the side of a flat bed without using bedrails: A little  Moving to and from bed to chair (including a wheelchair): A little  Standing up from a chair using your arms (e.g. wheelchair or bedside chair): A little  To walk in hospital room: A little  Climbing 3-5 steps with railing: A little  Basic Mobility - Total Score: 19    Education Documentation  Handouts, taught by Miya Anderson PTA at 7/8/2025 10:33 AM.  Learner: Patient  Readiness: Acceptance  Method: Explanation  Response: Verbalizes Understanding  Comment: hEP and safety    Precautions, taught by Miya Anderson PTA at 7/8/2025 10:33 AM.  Learner: Patient  Readiness: Acceptance  Method: Explanation  Response: Verbalizes Understanding  Comment: hEP and safety    Home Exercise Program, taught by Miya Anderson PTA at 7/8/2025 10:33 AM.  Learner: Patient  Readiness: Acceptance  Method: Explanation  Response: Verbalizes Understanding  Comment: hEP and safety    Mobility Training, taught by Miya Anderson PTA at 7/8/2025 10:33 AM.  Learner: Patient  Readiness: Acceptance  Method: Explanation  Response: Verbalizes Understanding  Comment: hEP and safety    Education Comments  No comments found.        OP EDUCATION:       Encounter Problems       Encounter Problems (Active)       PT Problem       Strengthening (Progressing)       Start:  07/07/25     Expected End:  07/21/25       Patient will perform 20+ reps of AROM/RROM for MICA LE's to improve safety and functional independence                  Encounter Problems (Resolved)       PT Problem       Transfers (Met)       Start:  07/07/25    Expected End:  07/21/25    Resolved:  07/08/25    Patient will perform all transfers with FWW SBA x 1 maintaining R hip precautions and WBAT BLE for safety to return home           Gait (Met)       Start:  07/07/25    Expected End:  07/21/25    Resolved:  07/08/25    Patient will amb 50+ feet with FWW CGA x 1 maintaining R hip precautions and WBAT BLE for safety           Stairs (Met)       Start:  07/07/25    Expected End:  07/21/25    Resolved:  07/08/25    Patient will ascend/descend 4+ stairs with rail/device prn and SBA/CGA x1 while maintaining WBAT BLE and R hip precautions

## 2025-07-08 NOTE — PROGRESS NOTES
Social work consult placed for discharge planning. SW reviewed pt's chart and communicated with TCC. No SW needs foreseen at this time. SW signing off; available upon request.    CINTHYA Ferreira ([id number])   Care Transitions

## 2025-07-08 NOTE — PROGRESS NOTES
07/08/25 1030   Discharge Planning   Living Arrangements Alone   Support Systems Children   Assistance Needed Walker, shower chair, grabber walking stick---all new   Type of Residence Private residence   Number of Stairs to Enter Residence 4   Number of Stairs Within Residence 0   Do you have animals or pets at home? Yes   Type of Animals or Pets dogs   Who is requesting discharge planning? Provider   Home or Post Acute Services In home services   Type of Home Care Services Home PT;Home OT   Expected Discharge Disposition Home H   Does the patient need discharge transport arranged? No   Financial Resource Strain   How hard is it for you to pay for the very basics like food, housing, medical care, and heating? Not hard   Housing Stability   In the last 12 months, was there a time when you were not able to pay the mortgage or rent on time? N   In the past 12 months, how many times have you moved where you were living? 0   At any time in the past 12 months, were you homeless or living in a shelter (including now)? N   Transportation Needs   In the past 12 months, has lack of transportation kept you from medical appointments or from getting medications? no   In the past 12 months, has lack of transportation kept you from meetings, work, or from getting things needed for daily living? No   Patient Choice   Provider Choice list and CMS website (https://medicare.gov/care-compare#search) for post-acute Quality and Resource Measure Data were provided and reviewed with: Patient  (arranged with provided previously)   Stroke Family Assessment   Stroke Family Assessment Needed No   Intensity of Service   Intensity of Service 0-30 min     Spoke with pt explained TCC role in Care Transitions and verified address, phone number and emergency contact information. PCP is  Desiree and preferred pharmacy is [facility] , denies issues obtaining or affording medications and takes as ordered. Pt is independent, lives at home with son and feels  safe. Plan is for pt to return home with son and [facility] as previously decided with her and her physician. TCC did ask if she would like to look at a list and chose another agency and pt declined. Pt is aware to reach out to CT if needs should change. [facility] 22/19. ADOD 7/8.  CT to follow. Michelle WARNERN/RN-TCC

## 2025-07-08 NOTE — CARE PLAN
Problem: Pain - Adult  Goal: Verbalizes/displays adequate comfort level or baseline comfort level  Outcome: Progressing     Problem: Safety - Adult  Goal: Free from fall injury  Outcome: Progressing       The clinical goals for the shift include Pt will rate pain a 4 or less by end of this shift.  Pt rates pain a 2/10 at this  time. Plan is for discharge home today

## 2025-07-08 NOTE — DISCHARGE INSTRUCTIONS
PLEASE READ CAREFULLY BEFORE CONTACTING YOUR PROVIDER.    WE WORK COLLABORATIVELY AS A TEAM. CALLING MULTIPLE STAFF MEMBERS REGARDING THE SAME ISSUE WILL DELAY YOUR CARE.  MYCHART IS THE PREFERRED COMMUNICATION FOR ALL TEAM MEMBERS.  ________________________________________________________________________________________________________________________________________________________________________    After Surgery Instructions: TOTAL HIP ARTHROPLASTY    The following is a general guide and may be applied to most patients that go home from the hospital after surgery. If you go to a rehab facility the timeline may deviate a little. Please do not hesitate to call our office for any questions or additional guidance. We will work with you to get the best experience from your new joint replacement.     WEEK 1  Relax…Don't Overdo it!  - Get up once an hour for light activity while you are awake. (get a drink, go to the bathroom, etc.)  - Keep the surgical site iced and elevated above your heart, if possible, while you are resting.  - You will have some light exercises given to you from the physical therapist in the hospital. They will teach you posterior hip precautions that you should be mindful of for the first 3 months after surgery.    SWELLING AND BRUISING    BRUISING AND SWELLING AFTER SURGERY IS NORMAL. As the patient becomes more mobile the bruising and swelling will begin to migrate towards the ankle and foot and is usually noticed toward the end of the day.    WEEK 2-3  You will be set up for home physical therapy and Occupational Therapy, for the first 3 to 4 weeks, then will recommend outpatient physical therapy. Be sure to do the home exercises on the days you are not attending physical therapy.    - Continue to progress walking as tolerated.   - Continue to use ice for soreness on the surgical site  - You may wean from your walker to a cane when you feel safe and comfortable to do so. Your physical  therapist will also be helpful with progressing your assistive device, which may be needed for up to 6 or more weeks.  - Be careful with bending and twisting - If it hurts, stop!!    FOLLOW UP  - Your first post-operative visit with Dr. Bliss will be 3 weeks postop.  Please call [phone] for appointment questions  - You will need new hip X-rays for this visit, and will be instructed to arrive early to obtain before appointment.  - Don't be nervous! This visit is to see how you are doing and make sure your incision is healing nicely and have begun working with physical therapy.       MEDICATION REFILLS - Please call main office number: [phone]    You will not receive a call indicating that your prescription has been filled.  Please contact your pharmacy with any questions.    Medication refills will be filled Monday-Friday 7am to 1pm ONLY. Please call the office or send a [employer] message for a refill request.  Any requests received outside of this timeframe will be handled on the next business day.  The office staff and orthopedic nurses cannot refill medications; messages should be left directly through the office or via my chart.  Please do not call multiple times or call other members of the care team for medication needs, this will cause the refill to take longer.    Per State and Institutional policy, pain medications can only be refilled every 7 days for up to six weeks following surgery. Dr Bliss does not believe in using narcotics for more than 4 wks after surgery, beyond that you may be referred to pain management physician, if your recovery is otherwise as expected.        DRIVING & TRAVEL AFTER SURGERY   Patients should anticipate waiting at least 3-6 weeks before traveling long distances after surgery.  At minimum you cannot be using your walker before considering driving and you cannot take any narcotic medications prior to driving. You will need to stop to walk around ever 1 hour during  your travel to help with blood clot prevention.  Please call the office or your joint nurse to discuss prior to post-surgical travel.  Patients may not drive until cleared by the joint nurse or the office.    DENTAL PROCEDURES & CLEANINGS  You must wait a minimum of 3 months for elective dental appointments (if possible, we understand emergencies happen), including routine cleanings or dental work including bridges, crowns, extractions, etc..  For any dental visit - cleaning or dental procedures - patients must take an antibiotic 1 hour before and after the appointment.  Antibiotics are needed at least for one year post surgery, before dental appointments.  The antibiotic prescribed will be based on each patient's allergies.    WOUND CARE    You have a waterproof bandage on your wound and may shower with this on. The waterproof bandage is to remain in place for 7 days.  Home physical therapist will help remove bandage 7 days postop.  You may leave your incision open to air after the bandage has been removed.  Your incision is closed with surgical staples.  Those will be removed 2 weeks after surgery by your physical therapist/home nurse.  If any issues with wound healing please call the surgeons office.  You may shower upon discharging from the hospital.  Soap and water is permitted to run over the surgical dressing.  Do not scrub directly over these items.  DO NOT soak your incision in a bath, hot tub, pool or pond/lake for a minimum of 3 weeks following your surgery.  DO NOT use lotions, creams, ointments on your wound for a minimum of 3 weeks following your surgery. At that time you may use vitamin E to assist with softening of your incision.    RESTARTING HOME ROUTINE - DIET & MEDICATIONS  Post-operative constipation can result due to a combination of inactivity, anesthesia and pain medication. To help prevent this, you should increase your water and fiber intake. Physical activity such as walking will also help  stimulate the bowels.   You may resume your normal diet when you discharge home.  Choose foods that help promote good bowel habits and prevent constipation, such as foods high in fiber.  You may restart your home medications the following day after your surgery UNLESS you have been given alternate instructions.  Follow the instructions given to you on your hospital discharge instructions for more information regarding your home medications.    IN-HOME PHYSICAL THERAPY & OUTPATIENT PHYSICAL THERAPY  Continue the exercises you were given in the hospital until you have been seen by in-home therapy.  Make sure to provide a phone number with the ability for the home care staff to leave a message if you do not answer your phone.    Depending on your treatment plan you will begin outpatient or home therapy after surgery. This should be arranged through the office or hospital during discharge  FOR PATIENT DOING HOME THERAPY: Outpatient physical therapy following hip replacement surgery should begin 3-4 weeks after surgery.  You should call to schedule this appointment ASAP if not already scheduled before surgery.  Waiting until you are ready for outpatient physical therapy will cause a delay in your care.  You may choose any outpatient physical therapy location.  Call the office for an order if needed.    EMERGENCIES - WHEN TO CONTACT THE SURGEON'S OFFICE IMMEDIATELY  Fever >101 with chills that has been present for at least 48 hours.   Excessive bleeding from incision that will not slow down. A small amount of drainage is normal and expected.  Once pressure is applied and the area is covered, do not continue to check the area regularly.  This will remove pressure and bleeding will continue.  Leave in place for 4-6 hours.  Signs of infection of incision-excessive drainage that is soaking through your dressing (especially if it is pus-like), redness that is spreading out from the edges of your incision, or increased warmth  around the area.  Excruciating pain for which the pain medication, taken as instructed, is not helping.  Severe calf pain.  Go directly to the emergency room or call 911, if you are experiencing chest pain or difficulty breathing.    ICE & COLD THERAPY INSTRUCTIONS    To assist with pain control and post-op swelling, you should be using ice regularly throughout recovery, especially for the first 6 weeks, regardless of the cold therapy method you use.      Always make sure there is a layer of protection between the cold pad and your skin.    If you are using ICE PACKS or GEL PACKS, you will need to alternate 20 minutes on, 20 minutes off twice per hour.    If you are using an ICE MACHINE, please follow the provided ice machine instructions.  These devices differ from ice or ice packs whereas the mechanism circulates water through tubing and a pad to provide longer periods of cold therapy to the desired site.  You can use your cold devices around the clock for optimal comfort.  We recommend using cold therapy after working with therapy or completing exercises on your own.  There is no set schedule in which you must follow while using cold therapy.  Below are a few points to remember when using a cold therapy device:    You do not need to need to use the 20 on, 20 off method.  Detach the pad from the cooler and ambulate at least once every hour.  You can check your skin under the pad at this time.  You may wear the cold therapy device during periods of sleep including overnight.  If you wake up during the night, you can check the skin at this time.  You do not need to wake up specifically to perform skin checks.  Empty the cooler and pad when device is not in use.  Follow 's instructions for cleaning your cold therapy device.      DISCHARGE MEDICATIONS - Please reference the sample schedule on the reverse side for instructions on how to best schedule medications.    PAIN MEDICATION       Pain medication has  been prescribed for post-operative pain control.  Take as directed, typically 1 tablet every 6 hours for mild to moderate pain, 2 tablets every 6 hours for moderate to severe pain.  If allowed by your primary medical doctor, you could take over-the-counter ibuprofen or Naprosyn in between doses as needed.   Side effects may be constipation and nausea, vomiting, sleepiness, dizziness, lightheadedness, headache, blurred vision, dry mouth sweating, itching (if you have itching, over-the -counter Benadryl can be used as needed).   You may NOT operate a motor vehicle while taking this medication or have been cleared by your surgeon or PA.      NON-NARCOTIC PAIN MEDICATION        You may return to preoperative anti-inflammatory medication if previously prescribed.  One of these may be prescribed (if you are able to take it) as an adjunct anti-inflammatory to assist in pain control. See medication example sheet. You will not receive refills on this medication.   Side effects may include nausea or upset stomach                   Acetaminophen may be used an adjunct for pain control, UNLESS IT IS ALREADY IN YOUR PRESCRIPTION NARCOTIC PAIN MED.   If able, you may take two 500 mg tablet every 6-8 hours for 4 weeks. See medication example sheet. No refills will be given after initial prescription.   Side effects may include nausea, heartburn, drowsiness, and headache.        BLOOD THINNER                Aspirin or Other Blood Thinner   Aspirin or another medication has been prescribed as a blood thinner to prevent blood clots in your leg or lungs. Take as prescribed on the bottle for 4 weeks. You will not receive a refill on this medication.   Do not take this medication if you are on another blood thinner.          STOOL SOFTENERS     Post-operative constipation can result due to a combination of inactivity, anesthesia and pain medication. To help prevent this, you should increase your water and fiber intake. Physical activity  such as walking will also help stimulate the bowels.    Senna can be obtained over-the-counter to help with constipation while on prescription pain medication.  Take one tablet twice daily for 4 weeks.   You may also take Miralax in combination with Senna to prevent constipation.  This can be purchased over the counter at your local pharmacy.  Take as instructed on the bottle.     Medication refills will be sent upon receipt of your request during the times listed above. Due to the high call volume, you will not receive a call confirming prescription refills; please do not call multiple times.  Prescription refills may take a few hours to process, you may follow up with your pharmacy for pickup availability.    _________________________________________________________________________________________________________________________________________________________________________    OFFICE INFORMATION    OFFICE LOCATIONS    Location 1: [facility]  [address] [address]  Office Number: [phone]    Location 2: [facility]  [address]  [place] [address]  Office Number: [phone]

## 2025-07-08 NOTE — PROGRESS NOTES
Occupational Therapy    Evaluation    Patient Name: Ana Bustillos  MRN: [id number]  Department: POR 3 E  Room: [facility]A  Today's Date: 7/8/2025  Time Calculation  Start Time: 0855  Stop Time: 0907  Time Calculation (min): 12 min    Assessment  IP OT Assessment  OT Assessment: Pt demonstrated ability to utilize AE for dressing activities and stand by assistance for functional transfers and mobility with FWW at this time. Pt safe to discharge to family's home with assistance PRN.  Prognosis: Good  Barriers to Discharge Home: No anticipated barriers  Evaluation/Treatment Tolerance: Patient tolerated treatment well  Medical Staff Made Aware: Yes  End of Session Communication: Bedside nurse  End of Session Patient Position: Up in chair, Alarm off, not on at start of session, Alarm off, caregiver present  Plan:  Treatment Interventions: ADL retraining, Equipment evaluation/education, Compensatory technique education  No Skilled OT: Safe to return home  OT Frequency: OT eval only  OT Discharge Recommendations: No OT needed after discharge  OT Recommended Transfer Status: Stand by assist (FWW)  OT - OK to Discharge: Yes    Subjective   Current Problem:  1. Primary osteoarthritis of right hip        2. Status post total hip replacement, right  aspirin 81 mg chewable tablet    HYDROcodone-acetaminophen (Norco) 5-325 mg tablet    Referral to Home Health        OT Visit Info:  OT Received On: 07/08/25  General Visit Info:  General  Reason for Referral: Recent surgery; R IAN  Referred By: Ruthy  Past Medical History Relevant to Rehab: Pres: R IAN; PMHx: HTN, HLD, prediabetes, osteopenia, sleep apnea, OA, GERD  Family/Caregiver Present: Yes  Caregiver Feedback: supportive daughter present  Prior to Session Communication: Bedside nurse (PTA)  Patient Position Received: Up in chair, Alarm off, not on at start of session, Alarm off, caregiver present  General Comment: Pt seen in room 3303 daughter present, pt motivated to  participate in OT session and for AE education.  Precautions:  LE Weight Bearing Status: Weight Bearing as Tolerated  Medical Precautions: Fall precautions  Post-Surgical Precautions: Right hip precautions  Precautions Comment: WBAT     Date/Time Vitals Session Patient Position Pulse Resp SpO2 BP MAP (mmHg)    07/08/25 0808 --  --  65  18  99 %  111/71  84                Pain:  Pain Assessment  Pain Assessment: 0-10  0-10 (Numeric) Pain Score: 2  Pain Type: Surgical pain  Pain Location: Hip  Pain Orientation: Right  Pain Interventions: Repositioned, Ambulation/increased activity, Therapeutic presence  Response to Interventions: Content/relaxed, Resting quietly    Objective   Cognition:  Overall Cognitive Status: Within Functional Limits  Orientation Level: Oriented X4  Insight: Within function limits  Impulsive: Within functional limits  Processing Speed: Within funtional limits           Home Living:  Home Living Comments: Pt reports she will stay with her son in single story home with 4 CARTER upon discharge. Pt will have walk in shower with built in bench and fww. t also has long handled sponge and reacher   Prior Function:  Level of [place]: Independent with ADLs and functional transfers, Independent with homemaking with ambulation  Receives Help From: Family  ADL Assistance: Independent  Homemaking Assistance: Independent  Ambulatory Assistance: Independent (no AE)  Prior Function Comments: Patient states she is retired, independent prior and attending pool therapy classes.  IADL History:     ADL:  Eating Assistance: Stand by  Grooming Assistance: Stand by  Bathing Assistance: Stand by  Bathing Deficit:  (pt reports owning long handled sponge)  UE Dressing Assistance: Stand by  UE Dressing Deficit:  (demonstrated ub dressing with supervision while seated in chair with arms)  LE Dressing Assistance: Stand by  LE Dressing Deficit:  (demonstrated use of reacher for donning and doffing underwear and  socks)  Toileting Assistance with Device: Stand by  Functional Assistance: Stand by  Functional Deficit:  (with FWW)  Activity Tolerance:  Endurance: Endurance does not limit participation in activity  Activity Tolerance Comments: no concerns  Bed Mobility/Transfers: Bed Mobility  Bed Mobility:  (Pt up in chair at start and end of session.)    Transfers  Transfer: Yes  Transfer 1  Technique 1: Sit to stand, Stand to sit  Transfer Device 1: Walker  Transfer Level of Assistance 1: Close supervision  Trials/Comments 1: 2 trials to from chair with arms      Functional Mobility:  Functional Mobility  Functional Mobility Performed: Yes  Functional Mobility 1  Surface 1: Level tile  Device 1: Rolling walker  Assistance 1: Close supervision  Comments 1: max household distances no concerns  Sitting Balance:  Static Sitting Balance  Static Sitting-Balance Support: Feet supported, No upper extremity supported  Static Sitting-Level of Assistance: Distant supervision  Standing Balance:  Static Standing Balance  Static Standing-Balance Support: Bilateral upper extremity supported  Static Standing-Level of Assistance: Close supervision  Dynamic Standing Balance  Dynamic Standing-Balance Support: No upper extremity supported  Dynamic Standing-Level of Assistance: Close supervision  Dynamic Standing-Comments: pulling up underwear over hips     Vision: Vision - Basic Assessment  Current Vision: Wears glasses all the time  Sensation:  Sensation Comment: Denies numbness/tingling  Strength:  Strength Comments: BUEs grossly WFL     Coordination:  Movements are Fluid and Coordinated: Yes   Hand Function:  Hand Function  Gross Grasp: Functional  Coordination: Functional  Extremities: RUE   RUE : Within Functional Limits and LUE   LUE: Within Functional Limits    Outcome Measures: [facility] Daily Activity  Putting on and taking off regular lower body clothing: A little  Bathing (including washing, rinsing, drying): A little  Putting on and  taking off regular upper body clothing: None  Toileting, which includes using toilet, bedpan or urinal: A little  Taking care of personal grooming such as brushing teeth: None  Eating Meals: None  Daily Activity - Total Score: 21      Education Documentation  Handouts, taught by Riya Rivas OT at 7/8/2025  9:33 AM.  Learner: Family, Patient  Readiness: Acceptance  Method: Explanation  Response: Verbalizes Understanding  Comment: AE use, functional transfers, safety, functional mobility with FWW    Body Mechanics, taught by Riya Rivas OT at 7/8/2025  9:33 AM.  Learner: Family, Patient  Readiness: Acceptance  Method: Explanation  Response: Verbalizes Understanding  Comment: AE use, functional transfers, safety, functional mobility with FWW    Precautions, taught by Riya Rivas OT at 7/8/2025  9:33 AM.  Learner: Family, Patient  Readiness: Acceptance  Method: Explanation  Response: Verbalizes Understanding  Comment: AE use, functional transfers, safety, functional mobility with FWW    ADL Training, taught by Riya Rivas OT at 7/8/2025  9:33 AM.  Learner: Family, Patient  Readiness: Acceptance  Method: Explanation  Response: Verbalizes Understanding  Comment: AE use, functional transfers, safety, functional mobility with FWW    Education Comments  No comments found.      Goals:

## 2025-07-08 NOTE — HH CARE COORDINATION
Home Care received a Referral for Physical Therapy and Occupational Therapy. We have processed the referral for a Start of Care on 7.09-7.10.25.     If you have any questions or concerns, please feel free to contact us at [phone]. Follow the prompts, enter your five digit zip code, and you will be directed to your care team on [facility] 3.

## 2025-07-08 NOTE — PROGRESS NOTES
Physical Therapy    Physical Therapy Treatment    Patient Name: Ana Bustillos  MRN: [id number]  Department: [address]  Room: [address] [place] [address]  Today's Date: 7/8/2025  Time Calculation  Start Time: 1450  Stop Time: 1505  Time Calculation (min): 15 min         Assessment/Plan   PT Assessment  Barriers to Discharge Home: No anticipated barriers  End of Session Communication: Bedside nurse  Assessment Comment: patient requried review of in and out of bed again. praticed two time. patient still requires verbal cues for correcting alignmnet of right LE  End of Session Patient Position: Up in chair, Alarm off, not on at start of session  PT Plan  Inpatient/Swing Bed or Outpatient: Inpatient  PT Plan  Treatment/Interventions: Bed mobility, Transfer training, Gait training, Stair training, Balance training, Neuromuscular re-education, Strengthening, Endurance training, Range of motion, Therapeutic exercise, Therapeutic activity  PT Plan: Ongoing PT  PT Frequency: BID  PT Discharge Recommendations: Low intensity level of continued care  Equipment Recommended upon Discharge:  (TBD)  PT Recommended Transfer Status: Assist x1  PT - OK to Discharge: Yes (When medically cleared)    PT Visit Info:  PT Received On: 07/08/25  Response to Previous Treatment: Patient reporting fatigue but able to participate.     General Visit Information:   General  Prior to Session Communication: Bedside nurse  Patient Position Received: Up in chair, Alarm off, not on at start of session  General Comment:     Subjective   Precautions:  Precautions  Precautions Comment: WBAT            Objective   Pain:  Pain Assessment  Pain Assessment: 0-10  0-10 (Numeric) Pain Score: 0 - No pain (no complaints)  Pain Interventions: Ambulation/increased activity  Response to Interventions: Decrease in pain  Cognition:  Cognition  Overall Cognitive Status: Within Functional Limits    Activity Tolerance:  Activity Tolerance  Endurance: Tolerates 10 - 20 min exercise with  multiple rests  Treatments:  patient  in chair reviewed home going  and HEp. sit to stand sBA. gait training with FWW 80 feet with  verbal cues to  correct right LE alignment.  practiced in and out of bed . with verbal cues   and cGA. reviewed and deconstructed car transfer .  Outcome Measures:  [facility] Basic Mobility  Turning from your back to your side while in a flat bed without using bedrails: None  Moving from lying on your back to sitting on the side of a flat bed without using bedrails: A little  Moving to and from bed to chair (including a wheelchair): A little  Standing up from a chair using your arms (e.g. wheelchair or bedside chair): A little  To walk in hospital room: A little  Climbing 3-5 steps with railing: A little  Basic Mobility - Total Score: 19    Education Documentation  Handouts, taught by Miya Anderson PTA at 7/8/2025  3:10 PM.  Learner: Patient  Readiness: Acceptance  Method: Explanation  Response: Verbalizes Understanding  Comment: HEP and hip preucations    Precautions, taught by Miya Anderson PTA at 7/8/2025  3:10 PM.  Learner: Patient  Readiness: Acceptance  Method: Explanation  Response: Verbalizes Understanding  Comment: HEP and hip preucations    Home Exercise Program, taught by Miya Anderson PTA at 7/8/2025  3:10 PM.  Learner: Patient  Readiness: Acceptance  Method: Explanation  Response: Verbalizes Understanding  Comment: HEP and hip preucations    Mobility Training, taught by Miya Anderson PTA at 7/8/2025  3:10 PM.  Learner: Patient  Readiness: Acceptance  Method: Explanation  Response: Verbalizes Understanding  Comment: HEP and hip preucations    Education Comments  No comments found.        OP EDUCATION:       Encounter Problems       Encounter Problems (Active)       PT Problem       Strengthening (Progressing)       Start:  07/07/25    Expected End:  07/21/25       Patient will perform 20+ reps of AROM/RROM for MICA LE's to improve safety and functional independence                   Encounter Problems (Resolved)       PT Problem       Transfers (Met)       Start:  07/07/25    Expected End:  07/21/25    Resolved:  07/08/25    Patient will perform all transfers with FWW SBA x 1 maintaining R hip precautions and WBAT BLE for safety to return home           Gait (Met)       Start:  07/07/25    Expected End:  07/21/25    Resolved:  07/08/25    Patient will amb 50+ feet with FWW CGA x 1 maintaining R hip precautions and WBAT BLE for safety           Stairs (Met)       Start:  07/07/25    Expected End:  07/21/25    Resolved:  07/08/25    Patient will ascend/descend 4+ stairs with rail/device prn and SBA/CGA x1 while maintaining WBAT BLE and R hip precautions

## 2025-07-08 NOTE — NURSING NOTE
Discharge instructions reviewed with pt and daughter. IV SL discontinued without difficulty. Pt transported off floor via w/c and volunteer.

## 2025-07-08 NOTE — DISCHARGE SUMMARY
Discharge Diagnosis  Right Total Hip Arthroplasty    Issues Requiring Follow-Up  Home care services to start within 48 hours. Outpatient PT to start per surgeon instructions.    Test Results Pending At Discharge  Pending Labs       No current pending labs.            Hospital Course  Patient underwent surgery for Right Total Hip Arthroplasty without complications. Patient was then takent to the PACU in stabe condition. Patient was then transferred to the [facility].  Pain was appropriately controlled and weaned to oral medications. Diet was advanced as tolerated. PT/OT was consulted to begin on post operative day 0 and recommended Home for patient on discharge. Patient had uneventful hospital course. Patient is to follow-up in 3 weeks at scheduled post-op visit.      Face to Face following surgical procedure on 07/08/25. The patient was awake and alert. VSS, afebrile. Sensation intact bilaterally, sural/saph/sp/tibal n. Motor intact flexion/extension/DF/PF/EHL/FHL bilaterally. Palpable symmetric DP/PT pulse bilaterally.    Pertinent Physical Exam At Time of Discharge  Physical Exam  Vitals and nursing note reviewed.  Constitutional:       Appearance: Normal appearance.   HENT:      Head: Normocephalic and atraumatic.   Eyes:      Extraocular Movements: Extraocular movements intact.   Cardiovascular:      Pulses: Normal pulses.   Pulmonary:      Effort: Pulmonary effort is normal.   Musculoskeletal:      Operative lower extremity in neutral alignment.  Hip dressing is clean, dry, intact.  Passive range of motion with mild discomfort, appropriate for postoperative timeframe.  Dermis is intact.  Neurovascular status is intact.  Dorsalis pedis pulses 2+, capillary refill <2 seconds.  Minimal swelling, no signs of compartment syndrome.  Negative Homans.   Skin:     General: Skin is warm and dry.      Capillary Refill: Capillary refill takes less than 2 seconds.   Neurological:      General: No focal deficit present.       Mental Status: Patient is alert and oriented to person, place, and time. Mental status is at baseline.   Psychiatric:         Mood and Affect: Mood normal.         Thought Content: Thought content normal.         Judgment: Judgment normal.           Home Medications  Medication List      Your medication list        START taking these medications        Instructions Last Dose Given Next Dose Due   HYDROcodone-acetaminophen 5-325 mg tablet  Commonly known as: Norco      Take 2 tablets by mouth every 6 hours if needed for severe pain (7 - 10) (Take 1 tablet every 6 hours as needed for moderate pain).              CHANGE how you take these medications        Instructions Last Dose Given Next Dose Due   aspirin 81 mg EC tablet  What changed: Another medication with the same name was added. Make sure you understand how and when to take each.           aspirin 81 mg chewable tablet  What changed: You were already taking a medication with the same name, and this prescription was added. Make sure you understand how and when to take each.      Chew and swallow 1 tablet (81 mg) 2 times a day. To prevent blood clots              CONTINUE taking these medications        Instructions Last Dose Given Next Dose Due   alendronate 35 mg tablet  Commonly known as: Fosamax      TAKE 1 TABLET BY MOUTH 1 TIME PER WEEK.       atenolol 25 mg tablet  Commonly known as: Tenormin      TAKE 1 TABLET BY MOUTH EVERY DAY       famotidine 20 mg tablet  Commonly known as: Pepcid      Take 1 tablet (20 mg) by mouth once daily.       lisinopril 20 mg tablet      TAKE 1 TABLET BY MOUTH ONCE DAILY. AS DIRECTED       omega-3 acid ethyl esters 1 gram capsule  Commonly known as: Lovaza      TAKE 2 CAPSULES BY MOUTH 2 TIMES A DAY.       pravastatin 40 mg tablet  Commonly known as: Pravachol      TAKE 1 TABLET BY MOUTH EVERY DAY              STOP taking these medications      chlorhexidine 0.12 % solution  Commonly known as: Peridex                  Where  to Get Your Medications        These medications were sent to [facility]  [address] [address]      Hours: 8AM to 6PM Mon-Fri, 8AM to 4PM Sat-Sun Phone: [phone]   aspirin 81 mg chewable tablet  HYDROcodone-acetaminophen 5-325 mg tablet             Outpatient Follow-Up  Patient to follow up in three weeks in the clinic  Special Instructions    Please read discharge instructions provided by your surgeon before calling with questions as this will delay care.    Medication refills-Narcotic pain medication will be refilled every 7 days per state law. Please request refills through [employer] preferably/[phone]. All medication requests may take up to 72 hours to refill and refills after Friday 1pm will be refilled on the next business day.

## 2025-07-09 ENCOUNTER — HOME CARE VISIT (OUTPATIENT)
Dept: HOME HEALTH SERVICES | Facility: HOME HEALTH | Age: 65
End: 2025-07-09
Payer: MEDICARE

## 2025-07-09 VITALS
DIASTOLIC BLOOD PRESSURE: 60 MMHG | WEIGHT: 207 LBS | OXYGEN SATURATION: 95 % | BODY MASS INDEX: 33.27 KG/M2 | HEIGHT: 66 IN | SYSTOLIC BLOOD PRESSURE: 110 MMHG | TEMPERATURE: 98.2 F | HEART RATE: 73 BPM

## 2025-07-09 PROCEDURE — 169592 NO-PAY CLAIM PROCEDURE

## 2025-07-09 PROCEDURE — G0152 HHCP-SERV OF OT,EA 15 MIN: HCPCS | Mod: HHH

## 2025-07-09 ASSESSMENT — ENCOUNTER SYMPTOMS
PAIN LOCATION - PAIN FREQUENCY: INTERMITTENT
PAIN LOCATION - EXACERBATING FACTORS: MOVEMENT
PAIN: 1
PAIN LOCATION: RIGHT HIP
OCCASIONAL FEELINGS OF UNSTEADINESS: 0
PAIN LOCATION - RELIEVING FACTORS: ICE, MEDS
PAIN LOCATION - PAIN SEVERITY: 2/10
HIGHEST PAIN SEVERITY IN PAST 24 HOURS: 3/10
PAIN LOCATION - PAIN DURATION: DAILY
PAIN LOCATION - PAIN QUALITY: ACHING
PERSON REPORTING PAIN: PATIENT

## 2025-07-09 ASSESSMENT — ACTIVITIES OF DAILY LIVING (ADL)
DRESSING_LB_CURRENT_FUNCTION: MINIMUM ASSIST
FEEDING_WITHIN_DEFINED_LIMITS: 1
PHYSICAL TRANSFERS ASSESSED: 1
AMBULATION ASSISTANCE: SUPERVISION
WASHING_LB_CURRENT_FUNCTION: INDEPENDENT
ENTERING_EXITING_HOME: STAND BY ASSIST
AMBULATION ASSISTANCE: 1
WASHING_UPB_CURRENT_FUNCTION: INDEPENDENT
GROOMING_WITHIN_DEFINED_LIMITS: 1
OASIS_M1830: 03
CURRENT_FUNCTION: SUPERVISION

## 2025-07-11 ENCOUNTER — HOME CARE VISIT (OUTPATIENT)
Dept: HOME HEALTH SERVICES | Facility: HOME HEALTH | Age: 65
End: 2025-07-11
Payer: MEDICARE

## 2025-07-11 VITALS — HEART RATE: 77 BPM | OXYGEN SATURATION: 96 % | RESPIRATION RATE: 16 BRPM

## 2025-07-11 PROCEDURE — G0151 HHCP-SERV OF PT,EA 15 MIN: HCPCS | Mod: HHH

## 2025-07-11 SDOH — HEALTH STABILITY: PHYSICAL HEALTH: EXERCISE COMMENTS: REVEIWED HEP AND PRECAUTIONS

## 2025-07-11 ASSESSMENT — ENCOUNTER SYMPTOMS
PERSON REPORTING PAIN: PATIENT
HIGHEST PAIN SEVERITY IN PAST 24 HOURS: 2/10
PAIN LOCATION: RIGHT HIP
PAIN LOCATION - PAIN SEVERITY: 1/10
PAIN SEVERITY GOAL: 1/10
SUBJECTIVE PAIN PROGRESSION: RAPIDLY IMPROVING
LOWEST PAIN SEVERITY IN PAST 24 HOURS: 1/10
PAIN: 1

## 2025-07-11 ASSESSMENT — ACTIVITIES OF DAILY LIVING (ADL)
CURRENT_FUNCTION: SUPERVISION
PHYSICAL TRANSFERS ASSESSED: 1
AMBULATION ASSISTANCE: SUPERVISION
AMBULATION ASSISTANCE ON FLAT SURFACES: 1
AMBULATION ASSISTANCE: 1
AMBULATION_DISTANCE/DURATION_TOLERATED: 35FT

## 2025-07-11 NOTE — HOME HEALTH
Subjective: Pt lives w son and his family, has in law suite on main level. Recent R IAN. Reviewed hip precautions and BLE HEP, amb safety.   Primary Reason for Home Care: Hip precautions. Home mobility and safety.  Skilled Needs: Fall prevention , gait trng.  Patient will benefit from physical therapy to address decreased home functional mobility, safety and balance, fall prevention education, home therapeutic exercise program.  Precautions: Hip precautions for posterior approach IAN.   Patient response to instruction: receptive   Patient instructed on plan of care and visit frequency.   Patient in agreement with plan of care and visit frequency.  1w5 Marylu/Desean to follow.  Discipline Communication: PTA PSC OT MD   Plan for next visit: HEP, gait trng, stairs, safety    Medication Reconciliation:    Demonstrates Adherence : Yes  Issues/Concerns: No  Provider Notified of Issues/Concerns: N/A  Caregiver Notified of Issues/Concerns: N/A  patient  response to instructions Verbalized Understanding  Pill bottles visualized during treatment Yes

## 2025-07-11 NOTE — Clinical Note
Subjective: Pt lives w son and his family, has in law suite on main level. Recent R IAN. Reviewed hip precautions and BLE HEP, amb safety.   Primary Reason for Home Care: Hip precautions. Home mobility and safety.  Skilled Needs: Fall prevention , gait trng.  Patient will benefit from physical therapy to address decreased home functional mobility, safety and balance, fall prevention education, home therapeutic exercise program.  Precautions: Hip precautions for posterior approach IAN.   Patient response to instruction: receptive   Patient instructed on plan of care and visit frequency.   Patient in agreement with plan of care and visit frequency.  1w5 Marylu/Desean to follow.  Discipline Communication: PTA PSC OT MD   Plan for next visit: HEP, gait trng, stairs, safety    Medication Reconciliation:Demonstrates Adherence : Yes  Issues/Concerns: No  Provider Notified of Issues/Concerns: N/A  Caregiver Notified of Issues/Concerns: N/A  patient  response to instructions Verbalized Understanding  Pill bottles visualized during treatment Yes

## 2025-07-13 DIAGNOSIS — E78.5 HYPERLIPIDEMIA, UNSPECIFIED HYPERLIPIDEMIA TYPE: ICD-10-CM

## 2025-07-13 DIAGNOSIS — I49.3 VENTRICULAR PREMATURE BEATS: ICD-10-CM

## 2025-07-13 RX ORDER — PRAVASTATIN SODIUM 40 MG/1
40 TABLET ORAL DAILY
Qty: 90 TABLET | Refills: 1 | Status: SHIPPED | OUTPATIENT
Start: 2025-07-13

## 2025-07-13 RX ORDER — ATENOLOL 25 MG/1
25 TABLET ORAL DAILY
Qty: 90 TABLET | Refills: 1 | Status: SHIPPED | OUTPATIENT
Start: 2025-07-13

## 2025-07-16 ENCOUNTER — HOME CARE VISIT (OUTPATIENT)
Dept: HOME HEALTH SERVICES | Facility: HOME HEALTH | Age: 65
End: 2025-07-16
Payer: MEDICARE

## 2025-07-16 VITALS — OXYGEN SATURATION: 98 % | HEART RATE: 85 BPM

## 2025-07-16 PROCEDURE — G0151 HHCP-SERV OF PT,EA 15 MIN: HCPCS | Mod: HHH

## 2025-07-16 ASSESSMENT — ENCOUNTER SYMPTOMS
PAIN LOCATION: RIGHT HIP
PAIN: 1
SUBJECTIVE PAIN PROGRESSION: WAXING AND WANING
LOWEST PAIN SEVERITY IN PAST 24 HOURS: 1/10
MUSCLE WEAKNESS: 1
HIGHEST PAIN SEVERITY IN PAST 24 HOURS: 2/10
PAIN SEVERITY GOAL: 1/10
PERSON REPORTING PAIN: PATIENT
PAIN LOCATION - PAIN SEVERITY: 1/10

## 2025-07-16 ASSESSMENT — ACTIVITIES OF DAILY LIVING (ADL)
PHYSICAL TRANSFERS ASSESSED: 1
CURRENT_FUNCTION: INDEPENDENT
AMBULATION ASSISTANCE: 1
AMBULATION ASSISTANCE: INDEPENDENT

## 2025-07-23 ENCOUNTER — HOME CARE VISIT (OUTPATIENT)
Dept: HOME HEALTH SERVICES | Facility: HOME HEALTH | Age: 65
End: 2025-07-23
Payer: MEDICARE

## 2025-07-23 VITALS
SYSTOLIC BLOOD PRESSURE: 121 MMHG | DIASTOLIC BLOOD PRESSURE: 64 MMHG | OXYGEN SATURATION: 98 % | TEMPERATURE: 97.9 F | RESPIRATION RATE: 17 BRPM | HEART RATE: 77 BPM

## 2025-07-23 PROCEDURE — G0157 HHC PT ASSISTANT EA 15: HCPCS | Mod: CQ,HHH

## 2025-07-23 SDOH — HEALTH STABILITY: PHYSICAL HEALTH: EXERCISE TYPE: R THA PROTOCOL THER EX

## 2025-07-23 SDOH — HEALTH STABILITY: PHYSICAL HEALTH: EXERCISE COMMENTS: IP FLEX, HIP ABD, HEEL/TOE RAISES, MINI SQUATS

## 2025-07-23 SDOH — HEALTH STABILITY: PHYSICAL HEALTH
EXERCISE COMMENTS: 1 SET OF 15 EACH , BLUE THERABAND  TOTAL HIP THER EX :   SUPINE THER EX: GLUT QUAD SETS 5 SEC HOLDS, HIP ABD, HEEL SLIDES, SAQ, BRIDGES  SEATED THER EX: LEG EXT, LAQ, THERABAND RESISTIVE HIP ABD, BALL SQUEEZES HIP ADD.    STANDING THER EX: MARCHES, H

## 2025-07-23 ASSESSMENT — ENCOUNTER SYMPTOMS
PERSON REPORTING PAIN: PATIENT
HIGHEST PAIN SEVERITY IN PAST 24 HOURS: 2/10
PAIN LOCATION - PAIN QUALITY: ACHING
LOWEST PAIN SEVERITY IN PAST 24 HOURS: 1/10
PAIN LOCATION - EXACERBATING FACTORS: TIME OF DAY
PAIN LOCATION: RIGHT HIP
MUSCLE WEAKNESS: 1
SUBJECTIVE PAIN PROGRESSION: RAPIDLY IMPROVING
OCCASIONAL FEELINGS OF UNSTEADINESS: 0
PAIN SEVERITY GOAL: 0/10
PAIN LOCATION - PAIN SEVERITY: 1/10
PAIN LOCATION - PAIN FREQUENCY: INTERMITTENT
PAIN: 1
ARTHRALGIAS: 1

## 2025-07-23 ASSESSMENT — ACTIVITIES OF DAILY LIVING (ADL)
AMBULATION_DISTANCE/DURATION_TOLERATED: 95 FT
AMBULATION ASSISTANCE ON FLAT SURFACES: 1

## 2025-07-25 ENCOUNTER — HOME CARE VISIT (OUTPATIENT)
Dept: HOME HEALTH SERVICES | Facility: HOME HEALTH | Age: 65
End: 2025-07-25
Payer: MEDICARE

## 2025-07-25 VITALS
DIASTOLIC BLOOD PRESSURE: 82 MMHG | OXYGEN SATURATION: 98 % | SYSTOLIC BLOOD PRESSURE: 122 MMHG | RESPIRATION RATE: 17 BRPM | HEART RATE: 77 BPM | TEMPERATURE: 97.9 F

## 2025-07-25 PROCEDURE — G0157 HHC PT ASSISTANT EA 15: HCPCS | Mod: CQ,HHH

## 2025-07-25 SDOH — HEALTH STABILITY: PHYSICAL HEALTH: EXERCISE TYPE: R THA PROTOCOL THER EX

## 2025-07-25 SDOH — HEALTH STABILITY: PHYSICAL HEALTH
EXERCISE COMMENTS: 2 SETS OF 10 EACH, BLUE THERABAND  TOTAL HIP THER EX :   SUPINE THER EX: GLUT QUAD SETS 5 SEC HOLDS, HIP ABD, HEEL SLIDES, SAQ, BRIDGES  SEATED THER EX: LEG EXT, LAQ, THERABAND RESISTIVE HIP ABD, BALL SQUEEZES HIP ADD.    STANDING THER EX: MARCHES, H

## 2025-07-25 SDOH — HEALTH STABILITY: PHYSICAL HEALTH: EXERCISE COMMENTS: IP FLEX, HIP ABD, HEEL/TOE RAISES, MINI SQUATS

## 2025-07-25 ASSESSMENT — ENCOUNTER SYMPTOMS
MUSCLE WEAKNESS: 1
LOWEST PAIN SEVERITY IN PAST 24 HOURS: 0/10
PAIN LOCATION - PAIN FREQUENCY: INTERMITTENT
HIGHEST PAIN SEVERITY IN PAST 24 HOURS: 3/10
PAIN LOCATION - EXACERBATING FACTORS: TIME OF DAY
PAIN LOCATION - PAIN QUALITY: ACHING
OCCASIONAL FEELINGS OF UNSTEADINESS: 0
PAIN LOCATION: RIGHT HIP
PAIN: 1
PERSON REPORTING PAIN: PATIENT
SUBJECTIVE PAIN PROGRESSION: RAPIDLY IMPROVING
ARTHRALGIAS: 1
PAIN LOCATION - RELIEVING FACTORS: TIME OF DAY
PAIN LOCATION - PAIN SEVERITY: 1/10
PAIN SEVERITY GOAL: 0/10

## 2025-07-25 ASSESSMENT — ACTIVITIES OF DAILY LIVING (ADL)
AMBULATION_DISTANCE/DURATION_TOLERATED: 120 FT
AMBULATION ASSISTANCE ON FLAT SURFACES: 1

## 2025-07-28 DIAGNOSIS — Z96.641 STATUS POST TOTAL HIP REPLACEMENT, RIGHT: ICD-10-CM

## 2025-07-29 ENCOUNTER — OFFICE VISIT (OUTPATIENT)
Dept: ORTHOPEDIC SURGERY | Facility: HOSPITAL | Age: 65
End: 2025-07-29
Payer: MEDICARE

## 2025-07-29 ENCOUNTER — HOSPITAL ENCOUNTER (OUTPATIENT)
Dept: RADIOLOGY | Facility: HOSPITAL | Age: 65
Discharge: HOME | End: 2025-07-29
Payer: MEDICARE

## 2025-07-29 VITALS — BODY MASS INDEX: 33.27 KG/M2 | WEIGHT: 207 LBS | HEIGHT: 66 IN

## 2025-07-29 DIAGNOSIS — M16.11 ARTHRITIS OF RIGHT HIP: Primary | ICD-10-CM

## 2025-07-29 DIAGNOSIS — Z96.641 STATUS POST TOTAL HIP REPLACEMENT, RIGHT: ICD-10-CM

## 2025-07-29 PROCEDURE — 99024 POSTOP FOLLOW-UP VISIT: CPT

## 2025-07-29 PROCEDURE — 73502 X-RAY EXAM HIP UNI 2-3 VIEWS: CPT | Mod: RIGHT SIDE | Performed by: RADIOLOGY

## 2025-07-29 PROCEDURE — 1036F TOBACCO NON-USER: CPT

## 2025-07-29 PROCEDURE — 1159F MED LIST DOCD IN RCRD: CPT

## 2025-07-29 PROCEDURE — 99212 OFFICE O/P EST SF 10 MIN: CPT

## 2025-07-29 PROCEDURE — 3008F BODY MASS INDEX DOCD: CPT

## 2025-07-29 PROCEDURE — 73502 X-RAY EXAM HIP UNI 2-3 VIEWS: CPT | Mod: RT

## 2025-07-29 ASSESSMENT — PAIN - FUNCTIONAL ASSESSMENT: PAIN_FUNCTIONAL_ASSESSMENT: NO/DENIES PAIN

## 2025-07-29 NOTE — PROGRESS NOTES
S/P right total hip arthroplasty done on 07/07/2025 with   2 more sessions left of home PT  No concerns  Xrays done today

## 2025-07-29 NOTE — PROGRESS NOTES
ORTHOPEDIC TOTAL JOINT  POST-OPERATIVE FOLLOW UP      ============================  IMPRESSION/PLAN:  ============================  64 y.o. female s/p Right Total Hip Arthroplasty completed on 7/7/2025    PLAN:  -Weightbearing: Weight bearing as tolerated  -DVT Prophylaxis: Aspirin 81 mg twice daily for 30 days postoperatively  -Radiology Studies: X-rays of the right hip were taken and reviewed today.  -Therapies: Continue PT/OT.  Referral provided for outpatient therapy at this time.  -Transition off assistive device as seen fit by patient and therapy  -Follow-up: In 8 weeks with Dr. Bliss for reevaluation, no x rays needed if patient is doing well        Ana Bustillos presents today for follow up of joint replacement above. Pain controlled with current treatment plan. Patient has begun physical therapy. They are currently doing therapy at home and she has 2 visits left. They are currently ambulating with no assistive devices.  She has no concerns or complaints today.    Review of Systems:   Constitutional: See HPI for pain assessment, No significant weight loss, recent trauma. Denies fevers/chills  Cardiovascular: No chest pain, shortness of breath  Respiratory: No difficulty breathing, cough  Gastrointestinal: No nausea, vomiting, diarrhea, constipation  Musculoskeletal: Noted in HPI, no arthralgias   Integumentary: No rashes, easy bruising, redness   Neurological: no numbness or tingling in extremities, no gait disturbances     Problem List[1]    =================================  EXAM  =================================  Constitutional   General appearance: Alert and in no acute distress. Well developed, well nourished.    Eyes   External Eye, Conjunctiva and lids: Normal external exam - extraocular movements intact (EOMI).   Ears, Nose, Mouth, and Throat   Hearing: Normal.   Neck   Neck: No neck mass was observed. Supple.   Pulmonary   Respiratory effort: No respiratory distress.   Cardiovascular    Examination of extremities: No peripheral edema.   Psychiatric   Judgment and insight: Intact.   Orientation to person, place, and time: Alert and oriented x 3.   Mood and affect: Normal.   Musculoskeletal:   Operative lower extremity in neutral alignment.  Hip incision is intact with no signs of wound dehiscence.  Active range of motion without discomfort.  Dermis is intact.  Neurovascular status is intact.  Dorsalis pedis pulses 2+, capillary refill <2 seconds.  Minimal swelling, no signs of compartment syndrome.  Negative Homans.       IMAGING:  Multiple views of the affected right hip demonstrate: Postsurgical changes of a right total hip arthroplasty.  Hardware in good alignment with no evidence of perihardware fractures or lucencies indicating hardware loosening.  No abnormalities..   X-rays were personally reviewed and interpreted by me.  Radiology reports were reviewed by me as well, if readily available at the time.    Casandra Slaughter PA-C  Physician Assistant  Orthopedic Surgery  [facility]         [1]   Patient Active Problem List  Diagnosis    Abnormal CT scan    Essential hypertension    Gastroesophageal reflux disease without esophagitis    Gross hematuria    Hyperlipidemia    Malignant neoplasm of right kidney (Multi)    Menopausal and postmenopausal disorder    Obstructive sleep apnea    Osteopenia    PVC (premature ventricular contraction)    Renal mass    Solitary kidney, acquired    Thyroid nodule    Vitamin D deficiency    Pain of right hip    Medicare annual wellness visit, subsequent    Primary osteoarthritis of right hip    Hypertensive kidney disease with stage 3a chronic kidney disease (Multi)    Status post total hip replacement, right

## 2025-07-30 ENCOUNTER — HOME CARE VISIT (OUTPATIENT)
Dept: HOME HEALTH SERVICES | Facility: HOME HEALTH | Age: 65
End: 2025-07-30
Payer: MEDICARE

## 2025-07-30 VITALS
HEART RATE: 77 BPM | OXYGEN SATURATION: 98 % | DIASTOLIC BLOOD PRESSURE: 76 MMHG | TEMPERATURE: 97.9 F | RESPIRATION RATE: 16 BRPM | SYSTOLIC BLOOD PRESSURE: 126 MMHG

## 2025-07-30 PROCEDURE — G0157 HHC PT ASSISTANT EA 15: HCPCS | Mod: CQ,HHH

## 2025-07-30 SDOH — HEALTH STABILITY: PHYSICAL HEALTH
EXERCISE COMMENTS: 2 SETS OF 10 EACH  TOTAL HIP THER EX :   SUPINE THER EX: GLUT QUAD SETS 5 SEC HOLDS, HIP ABD, HEEL SLIDES, SAQ, BRIDGES  SEATED THER EX: LEG EXT, LAQ, THERABAND RESISTIVE HIP ABD, BALL SQUEEZES HIP ADD.    STANDING THER EX: MARCHES, HIP FLEX, HIP ABD

## 2025-07-30 SDOH — HEALTH STABILITY: PHYSICAL HEALTH: EXERCISE COMMENTS: , HEEL/TOE RAISES, MINI SQUATS

## 2025-07-30 SDOH — HEALTH STABILITY: PHYSICAL HEALTH: EXERCISE TYPE: R THA PROTOCOL THER EX

## 2025-07-30 ASSESSMENT — ENCOUNTER SYMPTOMS
DENIES PAIN: 1
OCCASIONAL FEELINGS OF UNSTEADINESS: 0
ARTHRALGIAS: 1
PERSON REPORTING PAIN: PATIENT
MUSCLE WEAKNESS: 1

## 2025-07-30 ASSESSMENT — ACTIVITIES OF DAILY LIVING (ADL)
ADLS_COMMENTS: PT. IS I IN ALL ADL'S
AMBULATION ASSISTANCE ON FLAT SURFACES: 1
AMBULATION_DISTANCE/DURATION_TOLERATED: 140 FT

## 2025-07-31 DIAGNOSIS — M85.80 OSTEOPENIA, UNSPECIFIED LOCATION: ICD-10-CM

## 2025-07-31 DIAGNOSIS — I10 BENIGN HYPERTENSION: ICD-10-CM

## 2025-07-31 DIAGNOSIS — E55.9 VITAMIN D DEFICIENCY: ICD-10-CM

## 2025-07-31 DIAGNOSIS — E78.2 MIXED HYPERLIPIDEMIA: ICD-10-CM

## 2025-07-31 DIAGNOSIS — I49.3 VENTRICULAR PREMATURE BEATS: ICD-10-CM

## 2025-07-31 DIAGNOSIS — R73.02 IMPAIRED GLUCOSE TOLERANCE: ICD-10-CM

## 2025-07-31 DIAGNOSIS — Z00.00 MEDICARE ANNUAL WELLNESS VISIT, SUBSEQUENT: ICD-10-CM

## 2025-07-31 DIAGNOSIS — Z12.31 VISIT FOR SCREENING MAMMOGRAM: ICD-10-CM

## 2025-07-31 DIAGNOSIS — K21.9 GASTROESOPHAGEAL REFLUX DISEASE WITHOUT ESOPHAGITIS: ICD-10-CM

## 2025-07-31 DIAGNOSIS — I10 ESSENTIAL HYPERTENSION: ICD-10-CM

## 2025-07-31 DIAGNOSIS — Z78.0 POST-MENOPAUSAL: ICD-10-CM

## 2025-07-31 DIAGNOSIS — E78.5 HYPERLIPIDEMIA, UNSPECIFIED HYPERLIPIDEMIA TYPE: ICD-10-CM

## 2025-08-01 ENCOUNTER — HOME CARE VISIT (OUTPATIENT)
Dept: HOME HEALTH SERVICES | Facility: HOME HEALTH | Age: 65
End: 2025-08-01
Payer: MEDICARE

## 2025-08-01 ENCOUNTER — APPOINTMENT (OUTPATIENT)
Dept: PRIMARY CARE | Facility: CLINIC | Age: 65
End: 2025-08-01
Payer: MEDICARE

## 2025-08-01 VITALS
SYSTOLIC BLOOD PRESSURE: 124 MMHG | HEART RATE: 77 BPM | DIASTOLIC BLOOD PRESSURE: 65 MMHG | RESPIRATION RATE: 16 BRPM | OXYGEN SATURATION: 98 % | TEMPERATURE: 97.9 F

## 2025-08-01 PROCEDURE — G0157 HHC PT ASSISTANT EA 15: HCPCS | Mod: CQ,HHH

## 2025-08-01 SDOH — HEALTH STABILITY: PHYSICAL HEALTH
EXERCISE COMMENTS: 2 SETS OF 10 EACH, BLUE THERABAND  SEATED THER EX: MARCHES, LEG EXT, LAQ, TBAND HIP ABD, BALL SQUEEZES HIP ADD  SUPINE THER EX: SLR, HIP ABD, HEEL SLIDES, SAQ, GLUT SETS 5 SEC HOLDS QUAD SETS 5 SEC HOLDS  STANDING THER EX: MARCHES, HEEL/TOE RAISES, H

## 2025-08-01 SDOH — HEALTH STABILITY: PHYSICAL HEALTH: EXERCISE COMMENTS: IP ABD, HIP FLEX/EXT, MINI SQUATS , HAM CURLS

## 2025-08-01 SDOH — HEALTH STABILITY: PHYSICAL HEALTH: EXERCISE TYPE: B LE STRENGTHENING THER EX, ENDURANCE

## 2025-08-01 ASSESSMENT — ENCOUNTER SYMPTOMS
DENIES PAIN: 1
MUSCLE WEAKNESS: 1
ARTHRALGIAS: 1
OCCASIONAL FEELINGS OF UNSTEADINESS: 0
PAIN: NO PAIN REPORTED AT THIS TIME.
PERSON REPORTING PAIN: PATIENT

## 2025-08-01 ASSESSMENT — ACTIVITIES OF DAILY LIVING (ADL)
AMBULATION ASSISTANCE ON FLAT SURFACES: 1
ADLS_COMMENTS: PT. IS I IN ALL ADL'S
AMBULATION_DISTANCE/DURATION_TOLERATED: 150 FT

## 2025-08-04 ENCOUNTER — HOME CARE VISIT (OUTPATIENT)
Dept: HOME HEALTH SERVICES | Facility: HOME HEALTH | Age: 65
End: 2025-08-04
Payer: MEDICARE

## 2025-08-04 SDOH — ECONOMIC STABILITY: HOUSING INSECURITY: HOME SAFETY: PHONE DC

## 2025-08-04 ASSESSMENT — ENCOUNTER SYMPTOMS
PERSON REPORTING PAIN: PATIENT
DENIES PAIN: 1

## 2025-08-04 ASSESSMENT — ACTIVITIES OF DAILY LIVING (ADL)
HOME_HEALTH_OASIS: 00
OASIS_M1830: 00

## 2025-08-04 NOTE — Clinical Note
Patient knows current exercises. No questions. Agreeable to continue independently. Pt requesting dc from homecare.   Instructions for hip precautions and fall prevention. Keep pathways clear, use cane or walker for stability, have superv for steps and home egress.  Course of treatment: PT eval, medication reconciliation, home functional mobility and safety training, therapeutic exercise, fall prevention education.  Starting outpt and is driving now.  Precautions: Hip precautions and fall prevention reviewed.   Patient response to instruction: receptive  Goals met and health improved. No skin integrity issues. No changes to meds.

## 2025-08-06 ENCOUNTER — APPOINTMENT (OUTPATIENT)
Dept: ORTHOPEDIC SURGERY | Facility: HOSPITAL | Age: 65
End: 2025-08-06
Payer: MEDICARE

## 2025-08-06 ENCOUNTER — OFFICE VISIT (OUTPATIENT)
Dept: URGENT CARE | Age: 65
End: 2025-08-06
Payer: MEDICARE

## 2025-08-06 VITALS
SYSTOLIC BLOOD PRESSURE: 125 MMHG | HEART RATE: 61 BPM | DIASTOLIC BLOOD PRESSURE: 76 MMHG | OXYGEN SATURATION: 97 % | TEMPERATURE: 98.1 F | RESPIRATION RATE: 17 BRPM

## 2025-08-06 DIAGNOSIS — T81.49XA SURGICAL SITE INFECTION: Primary | ICD-10-CM

## 2025-08-06 PROCEDURE — 99213 OFFICE O/P EST LOW 20 MIN: CPT

## 2025-08-06 PROCEDURE — 3074F SYST BP LT 130 MM HG: CPT

## 2025-08-06 PROCEDURE — 1036F TOBACCO NON-USER: CPT

## 2025-08-06 PROCEDURE — 3078F DIAST BP <80 MM HG: CPT

## 2025-08-06 PROCEDURE — 1160F RVW MEDS BY RX/DR IN RCRD: CPT

## 2025-08-06 PROCEDURE — 1159F MED LIST DOCD IN RCRD: CPT

## 2025-08-06 PROCEDURE — 1126F AMNT PAIN NOTED NONE PRSNT: CPT

## 2025-08-06 RX ORDER — CLINDAMYCIN HYDROCHLORIDE 300 MG/1
300 CAPSULE ORAL 3 TIMES DAILY
Qty: 30 CAPSULE | Refills: 0 | Status: SHIPPED | OUTPATIENT
Start: 2025-08-06 | End: 2025-08-16

## 2025-08-06 ASSESSMENT — PAIN SCALES - GENERAL: PAINLEVEL_OUTOF10: 0-NO PAIN

## 2025-08-06 NOTE — PROGRESS NOTES
Subjective   Patient ID: Ana Bustillos is a 64 y.o. female. They present today with a chief complaint of sx incision check (Sx on rt hip on 7/7/25. Had some drainage. Feels fine but wants it looked at. ).    History of Present Illness  HPI    64-year-old female patient presents today for concerns of right hip replacement incision drainage that happened overnight.  She states that she is 1 month postop and this is the first time she has had drainage in several weeks.  She states that she is not having any pain in the area, and she has not had any fever, body aches, or fatigue.  She is leaving for out of town today and wanted her incision looked at.  She is here for evaluation.  Past Medical History  Allergies as of 08/06/2025 - Reviewed 08/06/2025   Allergen Reaction Noted    Penicillins Hives 01/01/1966    Sulfa (sulfonamide antibiotics) Hives 06/30/2023       Prescriptions Prior to Admission[1]     Medical History[2]    Surgical History[3]     reports that she has never smoked. She has never used smokeless tobacco. She reports that she does not currently use alcohol after a past usage of about 2.0 standard drinks of alcohol per week. She reports that she does not use drugs.    Review of Systems  Review of Systems   Skin:         Right hip surgical site drainage                                  Objective    Vitals:    08/06/25 0817   BP: 125/76   Pulse: 61   Resp: 17   Temp: 36.7 °C (98.1 °F)   SpO2: 97%     No LMP recorded. Patient is postmenopausal.    Physical Exam  Constitutional:       General: She is not in acute distress.     Appearance: She is not ill-appearing.     Cardiovascular:      Rate and Rhythm: Normal rate and regular rhythm.     Skin:     Comments: Healing right hip replacement incision; small scabbing along surgical site. No drainage noted from wound; minimal dried drainage noted in patient's underwear. No elly wound erythema         Procedures    Point of Care Test & Imaging Results from this  visit  No results found for this visit on 08/06/25.   Imaging  No results found.    Cardiology, Vascular, and Other Imaging  No other imaging results found for the past 2 days      Diagnostic study results (if any) were reviewed by JOANNE Denton.    Assessment/Plan   Allergies, medications, history, and pertinent labs/EKGs/Imaging reviewed by JOANNE Denton.     Medical Decision Making  No drainage noted at surgical site upon assessment; there is some dried her drainage on her underwear.  Prophylactic clindamycin sent to patient's pharmacy, as she has allergies to amoxicillin and sulfa.  Medication education provided.  Recommend following up with surgeons office when returning from out of town.  Red flag symptoms discussed; patient is in no acute distress and is hemodynamically stable at the time of discharge.    Orders and Diagnoses  Diagnoses and all orders for this visit:  Surgical site infection  -     clindamycin (Cleocin HCL) 300 mg capsule; Take 1 capsule (300 mg) by mouth 3 times a day for 10 days.      Medical Admin Record      Patient disposition: Home    Electronically signed by JOANNE Denton  8:46 AM           [1] (Not in a hospital admission)   [2]   Past Medical History:  Diagnosis Date    Cataract 2019    GERD (gastroesophageal reflux disease)     Hypertension     Personal history of COVID-19 01/25/2022    History of COVID-19    Personal history of other diseases of urinary system     History of hematuria    Personal history of other malignant neoplasm of kidney     History of renal carcinoma    Personal history of other malignant neoplasm of skin     History of other malignant neoplasm of skin    Renal cancer (Multi)     right    Sleep apnea     Urinary tract infection, site not specified 03/16/2021    Acute lower UTI   [3]   Past Surgical History:  Procedure Laterality Date    CATARACT EXTRACTION      COLONOSCOPY      OTHER SURGICAL  HISTORY  01/16/2020    Excision of basal cell carcinoma    OTHER SURGICAL HISTORY  07/27/2021    Kidney surgery    OTHER SURGICAL HISTORY  07/27/2021    Nephrectomy    TOTAL HIP ARTHROPLASTY Right 07/07/2025

## 2025-08-11 ENCOUNTER — EVALUATION (OUTPATIENT)
Dept: PHYSICAL THERAPY | Facility: HOSPITAL | Age: 65
End: 2025-08-11
Payer: MEDICARE

## 2025-08-11 DIAGNOSIS — R29.898 RIGHT LEG WEAKNESS: Primary | ICD-10-CM

## 2025-08-11 DIAGNOSIS — R26.9 GAIT ABNORMALITY: ICD-10-CM

## 2025-08-11 PROCEDURE — 97110 THERAPEUTIC EXERCISES: CPT | Mod: GP | Performed by: PHYSICAL THERAPIST

## 2025-08-11 PROCEDURE — 97161 PT EVAL LOW COMPLEX 20 MIN: CPT | Mod: GP | Performed by: PHYSICAL THERAPIST

## 2025-08-15 ENCOUNTER — TREATMENT (OUTPATIENT)
Dept: PHYSICAL THERAPY | Facility: HOSPITAL | Age: 65
End: 2025-08-15
Payer: MEDICARE

## 2025-08-15 DIAGNOSIS — R26.9 GAIT ABNORMALITY: ICD-10-CM

## 2025-08-15 DIAGNOSIS — R29.898 RIGHT LEG WEAKNESS: Primary | ICD-10-CM

## 2025-08-15 PROCEDURE — 97110 THERAPEUTIC EXERCISES: CPT | Mod: GP | Performed by: PHYSICAL THERAPIST

## 2025-08-18 ENCOUNTER — TREATMENT (OUTPATIENT)
Dept: PHYSICAL THERAPY | Facility: HOSPITAL | Age: 65
End: 2025-08-18
Payer: MEDICARE

## 2025-08-18 DIAGNOSIS — R26.9 GAIT ABNORMALITY: ICD-10-CM

## 2025-08-18 DIAGNOSIS — R29.898 RIGHT LEG WEAKNESS: Primary | ICD-10-CM

## 2025-08-18 PROCEDURE — 97110 THERAPEUTIC EXERCISES: CPT | Mod: GP | Performed by: PHYSICAL THERAPIST

## 2025-08-22 ENCOUNTER — TREATMENT (OUTPATIENT)
Dept: PHYSICAL THERAPY | Facility: HOSPITAL | Age: 65
End: 2025-08-22
Payer: MEDICARE

## 2025-08-22 ENCOUNTER — OFFICE VISIT (OUTPATIENT)
Dept: ORTHOPEDIC SURGERY | Facility: HOSPITAL | Age: 65
End: 2025-08-22
Payer: MEDICARE

## 2025-08-22 VITALS — HEIGHT: 66 IN | WEIGHT: 207 LBS | BODY MASS INDEX: 33.27 KG/M2

## 2025-08-22 DIAGNOSIS — Z96.641 STATUS POST TOTAL HIP REPLACEMENT, RIGHT: Primary | ICD-10-CM

## 2025-08-22 DIAGNOSIS — R29.898 RIGHT LEG WEAKNESS: Primary | ICD-10-CM

## 2025-08-22 DIAGNOSIS — R26.9 GAIT ABNORMALITY: ICD-10-CM

## 2025-08-22 PROCEDURE — 1159F MED LIST DOCD IN RCRD: CPT

## 2025-08-22 PROCEDURE — 97110 THERAPEUTIC EXERCISES: CPT | Mod: GP | Performed by: PHYSICAL THERAPIST

## 2025-08-22 PROCEDURE — 99024 POSTOP FOLLOW-UP VISIT: CPT

## 2025-08-22 PROCEDURE — 3008F BODY MASS INDEX DOCD: CPT

## 2025-08-22 PROCEDURE — 99212 OFFICE O/P EST SF 10 MIN: CPT

## 2025-08-22 ASSESSMENT — PAIN - FUNCTIONAL ASSESSMENT: PAIN_FUNCTIONAL_ASSESSMENT: NO/DENIES PAIN

## 2025-08-25 ENCOUNTER — TREATMENT (OUTPATIENT)
Dept: PHYSICAL THERAPY | Facility: HOSPITAL | Age: 65
End: 2025-08-25
Payer: MEDICARE

## 2025-08-25 DIAGNOSIS — R29.898 RIGHT LEG WEAKNESS: Primary | ICD-10-CM

## 2025-08-25 DIAGNOSIS — R26.9 GAIT ABNORMALITY: ICD-10-CM

## 2025-08-25 PROCEDURE — 97110 THERAPEUTIC EXERCISES: CPT | Mod: GP,CQ

## 2025-08-29 ENCOUNTER — TREATMENT (OUTPATIENT)
Dept: PHYSICAL THERAPY | Facility: HOSPITAL | Age: 65
End: 2025-08-29
Payer: MEDICARE

## 2025-08-29 DIAGNOSIS — R26.9 GAIT ABNORMALITY: ICD-10-CM

## 2025-08-29 DIAGNOSIS — R29.898 RIGHT LEG WEAKNESS: Primary | ICD-10-CM

## 2025-08-29 PROCEDURE — 97110 THERAPEUTIC EXERCISES: CPT | Mod: GP | Performed by: PHYSICAL THERAPIST

## 2025-09-02 ENCOUNTER — TREATMENT (OUTPATIENT)
Dept: PHYSICAL THERAPY | Facility: HOSPITAL | Age: 65
End: 2025-09-02
Payer: MEDICARE

## 2025-09-02 DIAGNOSIS — R26.9 GAIT ABNORMALITY: ICD-10-CM

## 2025-09-02 DIAGNOSIS — R29.898 RIGHT LEG WEAKNESS: Primary | ICD-10-CM

## 2025-09-02 PROCEDURE — 97110 THERAPEUTIC EXERCISES: CPT | Mod: GP,CQ

## 2025-09-05 ENCOUNTER — TREATMENT (OUTPATIENT)
Dept: PHYSICAL THERAPY | Facility: HOSPITAL | Age: 65
End: 2025-09-05
Payer: MEDICARE

## 2025-09-05 DIAGNOSIS — R26.9 GAIT ABNORMALITY: ICD-10-CM

## 2025-09-05 DIAGNOSIS — R29.898 RIGHT LEG WEAKNESS: Primary | ICD-10-CM

## 2025-09-05 PROCEDURE — 97110 THERAPEUTIC EXERCISES: CPT | Mod: GP | Performed by: PHYSICAL THERAPIST

## 2025-12-29 ENCOUNTER — APPOINTMENT (OUTPATIENT)
Dept: PRIMARY CARE | Facility: CLINIC | Age: 65
End: 2025-12-29
Payer: MEDICARE

## (undated) DEVICE — BLADE, SPECIAL CONVERSION SAGITTAL

## (undated) DEVICE — SUTURE, VICRYL, 1, 18 IN, CT-1, VIOLET

## (undated) DEVICE — Device

## (undated) DEVICE — DRAPE, SHEET, 17 X 23 IN

## (undated) DEVICE — SOLUTION, IRRIGATION, USP, SODIUM CHLORIDE 0.9%, 3000 ML

## (undated) DEVICE — NEEDLE, HYPODERMIC, REGULAR WALL, REGULAR BEVEL, 18 G X 1.5 IN

## (undated) DEVICE — COVER HANDLE LIGHT, STERIS, BLUE, STERILE

## (undated) DEVICE — KIT, TOTAL JOINT, CUSTOM, PORTAGE

## (undated) DEVICE — RETRIEVER, SUTURE, HEWSON

## (undated) DEVICE — DRAPE, SHEET, U, STERI DRAPE, 47 X 51 IN, DISPOSABLE, STERILE

## (undated) DEVICE — DRAPE, INCISE, ANTIMICROBIAL, IOBAN 2, LARGE, 17 X 23 IN, DISPOSABLE, STERILE

## (undated) DEVICE — DRAPE, TIBURON, HIP W/POUCHES

## (undated) DEVICE — APPLICATOR, CHLORAPREP, W/ORANGE TINT, 26ML

## (undated) DEVICE — SUTURE, VICRYL, 1, 36 IN, CT-1, UNDYED

## (undated) DEVICE — STOCKINETTE, TUBULAR, DBL PLY, PRE-CUT, 6 X 48, SYNTH, STERILE

## (undated) DEVICE — ELECTRODE, ELECTROSURGICAL, BLADE, EXTENDED

## (undated) DEVICE — DRILL BIT, 3.3 X 25MM

## (undated) DEVICE — PILLOW, ABDUCTION, MEDIUM

## (undated) DEVICE — SOLUTION, IRRIGATION, SODIUM CHLORIDE 0.9%, 1000 ML, POUR BOTTLE

## (undated) DEVICE — DRILL BIT, QUICK CONNECT, 2 X 125MM, STERILE

## (undated) DEVICE — SUTURE, VICRYL, 0, 36 IN, CT-1, UNDYED

## (undated) DEVICE — SUTURE, VICRYL 2-0, TAPER POINT, CT-1 UNDYED 27 INCH

## (undated) DEVICE — GLOVE, PROTEXIS PI CLASSIC, SZ-8.0, PF, PF, LF

## (undated) DEVICE — DRESSING, MEPILEX BORDER, POST-OP AG, 4 X 10 IN

## (undated) DEVICE — STAPLER, SKIN PROXIMATE, 35 WIDE

## (undated) DEVICE — PAD, GROUNDING, ELECTROSURGICAL, W/9 FT CABLE, POLYHESIVE II, ADULT, LF

## (undated) DEVICE — SLEEVE, SUCTION, E-SEP, 165MM

## (undated) DEVICE — GLOVE, SURGICAL, PROTEXIS PI BLUE W/NEUTHERA, 8.0, PF, LF

## (undated) DEVICE — CLOSURE SYSTEM, DERMABOND, PRINEO, 22CM, STERILE